# Patient Record
Sex: MALE | Race: WHITE | NOT HISPANIC OR LATINO | Employment: OTHER | URBAN - METROPOLITAN AREA
[De-identification: names, ages, dates, MRNs, and addresses within clinical notes are randomized per-mention and may not be internally consistent; named-entity substitution may affect disease eponyms.]

---

## 2017-02-02 ENCOUNTER — OFFICE VISIT (OUTPATIENT)
Dept: INTERNAL MEDICINE | Facility: CLINIC | Age: 82
End: 2017-02-02

## 2017-02-02 VITALS
BODY MASS INDEX: 26.31 KG/M2 | DIASTOLIC BLOOD PRESSURE: 72 MMHG | HEIGHT: 74 IN | SYSTOLIC BLOOD PRESSURE: 152 MMHG | HEART RATE: 84 BPM | WEIGHT: 205 LBS

## 2017-02-02 DIAGNOSIS — Z87.828 H/O TRAUMATIC SUBDURAL HEMATOMA: ICD-10-CM

## 2017-02-02 DIAGNOSIS — M47.816 SPONDYLOSIS OF LUMBAR REGION WITHOUT MYELOPATHY OR RADICULOPATHY: ICD-10-CM

## 2017-02-02 DIAGNOSIS — Z86.72 HISTORY OF DEEP VEIN THROMBOPHLEBITIS OF LOWER EXTREMITY: ICD-10-CM

## 2017-02-02 DIAGNOSIS — N20.0 RENAL STONES: ICD-10-CM

## 2017-02-02 DIAGNOSIS — F32.A CHRONIC DEPRESSION: ICD-10-CM

## 2017-02-02 DIAGNOSIS — K35.33 ACUTE APPENDICITIS WITH APPENDICEAL ABSCESS: Primary | ICD-10-CM

## 2017-02-02 DIAGNOSIS — I10 ESSENTIAL HYPERTENSION: ICD-10-CM

## 2017-02-02 DIAGNOSIS — F10.20 ALCOHOLISM (HCC): ICD-10-CM

## 2017-02-02 DIAGNOSIS — I48.0 PAF (PAROXYSMAL ATRIAL FIBRILLATION) (HCC): ICD-10-CM

## 2017-02-02 DIAGNOSIS — Z98.890 H/O ABDOMINAL AORTIC ANEURYSM REPAIR: ICD-10-CM

## 2017-02-02 DIAGNOSIS — M47.817 SPONDYLOSIS OF LUMBOSACRAL REGION WITHOUT MYELOPATHY OR RADICULOPATHY: ICD-10-CM

## 2017-02-02 DIAGNOSIS — E78.00 HYPERCHOLESTEROLEMIA: ICD-10-CM

## 2017-02-02 DIAGNOSIS — F41.9 CHRONIC ANXIETY: ICD-10-CM

## 2017-02-02 PROBLEM — M47.819 OSTEOARTHRITIS OF SPINE WITHOUT MYELOPATHY OR RADICULOPATHY: Status: ACTIVE | Noted: 2017-02-02

## 2017-02-02 LAB
ALBUMIN SERPL-MCNC: 3.91 G/DL (ref 3.4–4.6)
ALBUMIN/GLOB SERPL: 1.2 G/DL
ALP SERPL-CCNC: 76 U/L (ref 46–116)
ALT SERPL W P-5'-P-CCNC: 20 U/L (ref 16–63)
ANION GAP SERPL CALCULATED.3IONS-SCNC: 9 MMOL/L
AST SERPL-CCNC: 12 U/L (ref 7–37)
BILIRUB SERPL-MCNC: 0.5 MG/DL (ref 0.2–1)
BILIRUB UR QL STRIP: NEGATIVE
BUN BLD-MCNC: 23 MG/DL (ref 6–22)
BUN/CREAT SERPL: 18.9 (ref 7–25)
CALCIUM SPEC-SCNC: 9.4 MG/DL (ref 8.6–10.5)
CHLORIDE SERPL-SCNC: 103 MMOL/L (ref 95–107)
CLARITY UR: CLEAR
CO2 SERPL-SCNC: 28 MMOL/L (ref 23–32)
COLOR UR: YELLOW
CREAT BLD-MCNC: 1.22 MG/DL (ref 0.7–1.3)
GFR SERPL CREATININE-BSD FRML MDRD: 57 ML/MIN/1.73
GLOBULIN UR ELPH-MCNC: 3.3 GM/DL
GLUCOSE BLD-MCNC: 105 MG/DL (ref 70–100)
GLUCOSE UR STRIP-MCNC: NEGATIVE MG/DL
HGB UR QL STRIP.AUTO: NEGATIVE
KETONES UR QL STRIP: ABNORMAL
LEUKOCYTE ESTERASE UR QL STRIP.AUTO: NEGATIVE
NITRITE UR QL STRIP: NEGATIVE
PH UR STRIP.AUTO: 5.5 [PH] (ref 5–8)
POTASSIUM BLD-SCNC: 4.3 MMOL/L (ref 3.3–5.3)
PROT SERPL-MCNC: 7.2 G/DL (ref 6.3–8.4)
PROT UR QL STRIP: ABNORMAL
SODIUM BLD-SCNC: 140 MMOL/L (ref 136–145)
SP GR UR STRIP: >=1.03 (ref 1–1.03)
TSH SERPL DL<=0.05 MIU/L-ACNC: 2.63 MIU/ML (ref 0.4–4.2)
UROBILINOGEN UR QL STRIP: ABNORMAL

## 2017-02-02 PROCEDURE — 80053 COMPREHEN METABOLIC PANEL: CPT | Performed by: INTERNAL MEDICINE

## 2017-02-02 PROCEDURE — 99205 OFFICE O/P NEW HI 60 MIN: CPT | Performed by: INTERNAL MEDICINE

## 2017-02-02 PROCEDURE — 84443 ASSAY THYROID STIM HORMONE: CPT | Performed by: INTERNAL MEDICINE

## 2017-02-02 PROCEDURE — 81003 URINALYSIS AUTO W/O SCOPE: CPT | Performed by: INTERNAL MEDICINE

## 2017-02-02 RX ORDER — SIMVASTATIN 10 MG
TABLET ORAL
COMMUNITY
Start: 2017-01-13 | End: 2017-05-04 | Stop reason: SDUPTHER

## 2017-02-02 RX ORDER — LISINOPRIL 40 MG/1
40 TABLET ORAL
Status: ON HOLD | COMMUNITY
Start: 2017-01-17 | End: 2017-09-10

## 2017-02-02 RX ORDER — CYCLOBENZAPRINE HCL 5 MG
TABLET ORAL
COMMUNITY
Start: 2016-12-02 | End: 2017-02-02

## 2017-02-02 NOTE — PROGRESS NOTES
Subjective   Neal Flores is a 81 y.o. male.     History of Present Illness he is here today to establish care and he has a long history of multiple medical problems.  He has been treated for hypertension over the last 10-15 years presently using lisinopril.  He is on Zocor for hypercholesterolemia and Lexapro over the last 8-10 years for chronic depression and anxiety.    He was hospitalized in September 2016 with an appendiceal abscess.  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  He knows of at least 1 on the left and 2 on the right.  His last fall was in December 2016.  He has had some mild dizziness and mild frontal headache since that time but no repeat syncope or falls.  He denied any focal neurologic symptoms and he has not had any nausea or vomiting.  He had an evacuation of a left subdural hematoma in 2010.    He underwent correction and stent placement for an abdominal aortic aneurysm 2008.  He underwent electrocardioversion in 2011 4 PAF. He underwent cervical spine surgery in the 1980s.  He has known lumbar spine degenerative joint and degenerative disc disease.  He uses up to 6 Advil per day without relief.  He was treated for deep vein thrombophlebitis of the left lower extremity involving the femoral vein in May 2016.    He smokes one pack of cigarettes per dayand drinks whiskey during his binge drinking.  For the last year he has had one flight and mild to moderate flat level dyspnea on exertion.  He denies any chest pain or PND.  He denies daily cough or wheezing.  He has had nocturia for at least one year and does have a sense of incomplete voiding at times.  He denies any history of migraine or asthma or peptic ulcer disease.  He does have known renal stones without any episodes of renal colic.        Review of Systems   Constitutional: Negative for activity change, appetite change and fatigue.   HENT: Negative for trouble swallowing.     Respiratory: Positive for shortness of breath. Negative for cough, chest tightness and wheezing.    Cardiovascular: Negative for chest pain, palpitations and leg swelling.   Gastrointestinal: Negative for abdominal pain, blood in stool, constipation, diarrhea, nausea and vomiting.   Genitourinary: Positive for frequency. Negative for difficulty urinating, flank pain and hematuria.   Musculoskeletal: Positive for back pain. Negative for arthralgias, gait problem, joint swelling and neck stiffness.   Neurological: Positive for dizziness and headaches. Negative for seizures, syncope, facial asymmetry, speech difficulty, weakness and numbness.   Psychiatric/Behavioral: Negative for confusion and dysphoric mood. The patient is not nervous/anxious.        Objective   Physical Exam   Constitutional: He is oriented to person, place, and time. Vital signs are normal. He appears well-developed and well-nourished. He is active. He does not appear ill.   Eyes: Conjunctivae are normal.   Fundoscopic exam:       The right eye shows no AV nicking, no exudate and no hemorrhage.        The left eye shows no AV nicking, no exudate and no hemorrhage.   Neck: Carotid bruit is not present. No thyroid mass and no thyromegaly present.   Cardiovascular: Normal rate, regular rhythm, S1 normal and S2 normal.  Exam reveals no S3 and no S4.    No murmur heard.  Pulses:       Posterior tibial pulses are 2+ on the right side, and 2+ on the left side.   Pulmonary/Chest: No tachypnea. No respiratory distress. He has no decreased breath sounds. He has no wheezes. He has no rhonchi. He has no rales.   Abdominal: Soft. Normal appearance and bowel sounds are normal. He exhibits no abdominal bruit and no mass. There is no hepatosplenomegaly. There is no tenderness.       Vascular Status -  His exam exhibits no right foot edema. His exam exhibits no left foot edema.  Neurological: He is alert and oriented to person, place, and time. He has normal  strength. He displays no tremor. He displays a negative Romberg sign. Gait normal.   Reflex Scores:       Bicep reflexes are 2+ on the right side and 2+ on the left side.  Psychiatric: He has a normal mood and affect. His speech is normal and behavior is normal. Judgment and thought content normal. Cognition and memory are normal.       Assessment/Plan assessment #1 hypertension-good control #2 alcoholism-without a doubt his major present problem and this was discussed at some length with the patient and his daughter #3 lumbar spine degenerative joint and degenerative disc disease- I do not believe that Advil or Tylenol will be of benefit and certainly I do not feel that he should begin   Narcotic analgesics. #4 history of subdural hematoma-no sign of neurologic compromise at present.    Plan #1 he is strongly advised to attend AA on a regular basis #2 no change in present medication #3 CBC, CMP, urinalysis, TSH today.  Routine follow-up with me in 3 months.

## 2017-02-03 LAB
BASOPHILS # BLD AUTO: 0.1 X10E3/UL (ref 0–0.2)
BASOPHILS NFR BLD AUTO: 1 %
EOSINOPHIL # BLD AUTO: 0.3 X10E3/UL (ref 0–0.4)
EOSINOPHIL # BLD AUTO: 4 %
ERYTHROCYTE [DISTWIDTH] IN BLOOD BY AUTOMATED COUNT: 20.2 % (ref 12.3–15.4)
HCT VFR BLD AUTO: 39.7 % (ref 37.5–51)
HGB BLD-MCNC: 12.5 G/DL (ref 12.6–17.7)
IMM GRANULOCYTES # BLD: 0 X10E3/UL (ref 0–0.1)
IMM GRANULOCYTES NFR BLD: 0 %
LYMPHOCYTES # BLD AUTO: 1.6 X10E3/UL (ref 0.7–3.1)
LYMPHOCYTES NFR BLD AUTO: 22 %
MCH RBC QN AUTO: 24.3 PG (ref 26.6–33)
MCHC RBC AUTO-ENTMCNC: 31.5 G/DL (ref 31.5–35.7)
MCV RBC AUTO: 77 FL (ref 79–97)
MONOCYTES # BLD AUTO: 0.5 X10E3/UL (ref 0.1–0.9)
MONOCYTES NFR BLD AUTO: 7 %
NEUTROPHILS # BLD AUTO: 4.9 X10E3/UL (ref 1.4–7)
NEUTROPHILS NFR BLD AUTO: 66 %
PLATELET # BLD AUTO: 266 X10E3/UL (ref 150–379)
RBC # BLD AUTO: 5.14 X10E6/UL (ref 4.14–5.8)
WBC # BLD AUTO: 7.3 X10E3/UL (ref 3.4–10.8)

## 2017-02-27 ENCOUNTER — TELEPHONE (OUTPATIENT)
Dept: INTERNAL MEDICINE | Facility: CLINIC | Age: 82
End: 2017-02-27

## 2017-02-27 DIAGNOSIS — F39 MOOD DISORDER (HCC): Primary | ICD-10-CM

## 2017-02-27 NOTE — TELEPHONE ENCOUNTER
----- Message from Sindy Cuello sent at 2/27/2017  3:30 PM EST -----  Pt needs refill on his Lexapro 20 mg   He takes one a day- last given by his previous MD---- Dr. Eisenmenger  This is a med you have not filled yet for pt    [address] - [phone] [facility] [phone] FX    Pt# [phone]

## 2017-02-28 RX ORDER — ESCITALOPRAM OXALATE 20 MG/1
20 TABLET ORAL DAILY
Qty: 90 TABLET | Refills: 0 | Status: SHIPPED | OUTPATIENT
Start: 2017-02-28 | End: 2017-06-05 | Stop reason: SDUPTHER

## 2017-02-28 NOTE — TELEPHONE ENCOUNTER
Call made to patients [facility] pharmacy to see when the last refill was given to Ms. Flores and the pharmacist informed me that Mr. Flores had Lexapro 20 mg  filled by Dr. James Eisenmenger on 1/13/17 for # 45 tablets w/ zero refills.    Per provider request I sent in a Lexapro 20 mg for 90 tablets w/ zero refills to the patients local pharmacy.

## 2017-05-04 ENCOUNTER — OFFICE VISIT (OUTPATIENT)
Dept: INTERNAL MEDICINE | Facility: CLINIC | Age: 82
End: 2017-05-04

## 2017-05-04 VITALS
HEART RATE: 84 BPM | WEIGHT: 207.6 LBS | SYSTOLIC BLOOD PRESSURE: 118 MMHG | BODY MASS INDEX: 26.64 KG/M2 | DIASTOLIC BLOOD PRESSURE: 46 MMHG | HEIGHT: 74 IN

## 2017-05-04 DIAGNOSIS — N91.2 AMENIA: ICD-10-CM

## 2017-05-04 DIAGNOSIS — I48.0 PAF (PAROXYSMAL ATRIAL FIBRILLATION) (HCC): ICD-10-CM

## 2017-05-04 DIAGNOSIS — I10 BENIGN ESSENTIAL HYPERTENSION: Primary | ICD-10-CM

## 2017-05-04 DIAGNOSIS — M51.36 DEGENERATION OF INTERVERTEBRAL DISC OF LUMBAR REGION: ICD-10-CM

## 2017-05-04 PROBLEM — G62.1 ALCOHOL-INDUCED POLYNEUROPATHY (HCC): Status: ACTIVE | Noted: 2017-05-04

## 2017-05-04 PROBLEM — M47.816 SPONDYLOSIS OF LUMBAR REGION WITHOUT MYELOPATHY OR RADICULOPATHY: Status: RESOLVED | Noted: 2017-02-02 | Resolved: 2017-05-04

## 2017-05-04 PROBLEM — M47.819 OSTEOARTHRITIS OF SPINE WITHOUT MYELOPATHY OR RADICULOPATHY: Status: RESOLVED | Noted: 2017-02-02 | Resolved: 2017-05-04

## 2017-05-04 LAB
BASOPHILS # BLD AUTO: 0.07 10*3/MM3 (ref 0–0.2)
BASOPHILS NFR BLD AUTO: 1 % (ref 0–2)
DEPRECATED RDW RBC AUTO: 48.8 FL (ref 37–54)
EOSINOPHIL # BLD AUTO: 0.7 10*3/MM3 (ref 0–0.7)
EOSINOPHIL NFR BLD AUTO: 10.1 % (ref 0–5)
ERYTHROCYTE [DISTWIDTH] IN BLOOD BY AUTOMATED COUNT: 16.9 % (ref 11.5–15)
HCT VFR BLD AUTO: 38.3 % (ref 40.1–51)
HGB BLD-MCNC: 11.6 G/DL (ref 13.7–17.5)
LYMPHOCYTES # BLD AUTO: 1.57 10*3/MM3 (ref 0.8–7)
LYMPHOCYTES NFR BLD AUTO: 22.6 % (ref 10–60)
MCH RBC QN AUTO: 24.2 PG (ref 26–34)
MCHC RBC AUTO-ENTMCNC: 30.3 G/DL (ref 31–37)
MCV RBC AUTO: 80 FL (ref 80–100)
MONOCYTES # BLD AUTO: 0.61 10*3/MM3 (ref 0–1)
MONOCYTES NFR BLD AUTO: 8.8 % (ref 0–13)
NEUTROPHILS # BLD AUTO: 4.01 10*3/MM3 (ref 1–11)
NEUTROPHILS NFR BLD AUTO: 57.5 % (ref 30–85)
PLATELET # BLD AUTO: 257 10*3/MM3 (ref 150–450)
PMV BLD AUTO: 10.2 FL (ref 6–12)
RBC # BLD AUTO: 4.79 10*6/MM3 (ref 4.63–6.08)
WBC NRBC COR # BLD: 6.96 10*3/MM3 (ref 5–10)

## 2017-05-04 PROCEDURE — 85025 COMPLETE CBC W/AUTO DIFF WBC: CPT | Performed by: INTERNAL MEDICINE

## 2017-05-04 PROCEDURE — 99213 OFFICE O/P EST LOW 20 MIN: CPT | Performed by: INTERNAL MEDICINE

## 2017-05-04 PROCEDURE — 36415 COLL VENOUS BLD VENIPUNCTURE: CPT | Performed by: INTERNAL MEDICINE

## 2017-05-04 RX ORDER — SIMVASTATIN 10 MG
10 TABLET ORAL NIGHTLY
Qty: 90 TABLET | Refills: 3 | Status: SHIPPED | OUTPATIENT
Start: 2017-05-04 | End: 2018-05-07 | Stop reason: SDUPTHER

## 2017-05-10 ENCOUNTER — TELEPHONE (OUTPATIENT)
Dept: INTERNAL MEDICINE | Facility: CLINIC | Age: 82
End: 2017-05-10

## 2017-05-15 ENCOUNTER — TELEPHONE (OUTPATIENT)
Dept: INTERNAL MEDICINE | Facility: CLINIC | Age: 82
End: 2017-05-15

## 2017-06-05 DIAGNOSIS — F39 MOOD DISORDER (HCC): ICD-10-CM

## 2017-06-05 RX ORDER — ESCITALOPRAM OXALATE 20 MG/1
TABLET ORAL
Qty: 90 TABLET | Refills: 1 | Status: SHIPPED | OUTPATIENT
Start: 2017-06-05 | End: 2017-11-26 | Stop reason: SDUPTHER

## 2017-09-06 ENCOUNTER — APPOINTMENT (OUTPATIENT)
Dept: CT IMAGING | Facility: HOSPITAL | Age: 82
End: 2017-09-06

## 2017-09-06 ENCOUNTER — APPOINTMENT (OUTPATIENT)
Dept: GENERAL RADIOLOGY | Facility: HOSPITAL | Age: 82
End: 2017-09-06

## 2017-09-06 ENCOUNTER — HOSPITAL ENCOUNTER (INPATIENT)
Facility: HOSPITAL | Age: 82
LOS: 4 days | Discharge: HOME HEALTH | End: 2017-09-10
Attending: EMERGENCY MEDICINE | Admitting: HOSPITALIST

## 2017-09-06 DIAGNOSIS — R26.89 IMPAIRED GAIT AND MOBILITY: ICD-10-CM

## 2017-09-06 DIAGNOSIS — N17.9 AKI (ACUTE KIDNEY INJURY) (HCC): Primary | ICD-10-CM

## 2017-09-06 DIAGNOSIS — G62.1 ALCOHOL-INDUCED POLYNEUROPATHY (HCC): ICD-10-CM

## 2017-09-06 DIAGNOSIS — E86.0 DEHYDRATION: ICD-10-CM

## 2017-09-06 DIAGNOSIS — F10.10 ALCOHOL ABUSE: ICD-10-CM

## 2017-09-06 LAB
ALBUMIN SERPL-MCNC: 3.4 G/DL (ref 3.5–5.2)
ALBUMIN/GLOB SERPL: 1 G/DL
ALP SERPL-CCNC: 104 U/L (ref 39–117)
ALT SERPL W P-5'-P-CCNC: 61 U/L (ref 1–41)
AMMONIA BLD-SCNC: 26 UMOL/L (ref 16–60)
ANION GAP SERPL CALCULATED.3IONS-SCNC: 26.2 MMOL/L
APTT PPP: 26.7 SECONDS (ref 22.7–35.4)
AST SERPL-CCNC: 100 U/L (ref 1–40)
BACTERIA UR QL AUTO: ABNORMAL /HPF
BASOPHILS # BLD AUTO: 0.02 10*3/MM3 (ref 0–0.2)
BASOPHILS NFR BLD AUTO: 0.2 % (ref 0–1.5)
BILIRUB SERPL-MCNC: 1.3 MG/DL (ref 0.1–1.2)
BILIRUB UR QL STRIP: NEGATIVE
BUN BLD-MCNC: 23 MG/DL (ref 8–23)
BUN/CREAT SERPL: 9.3 (ref 7–25)
CALCIUM SPEC-SCNC: 9.6 MG/DL (ref 8.6–10.5)
CHLORIDE SERPL-SCNC: 91 MMOL/L (ref 98–107)
CLARITY UR: ABNORMAL
CO2 SERPL-SCNC: 20.8 MMOL/L (ref 22–29)
COD CRY URNS QL: ABNORMAL /HPF
COLOR UR: YELLOW
CREAT BLD-MCNC: 2.47 MG/DL (ref 0.76–1.27)
DEPRECATED RDW RBC AUTO: 73.5 FL (ref 37–54)
EOSINOPHIL # BLD AUTO: 0 10*3/MM3 (ref 0–0.7)
EOSINOPHIL NFR BLD AUTO: 0 % (ref 0.3–6.2)
ERYTHROCYTE [DISTWIDTH] IN BLOOD BY AUTOMATED COUNT: 23 % (ref 11.5–14.5)
ETHANOL BLD-MCNC: <10 MG/DL (ref 0–10)
ETHANOL UR QL: <0.01 %
FINE GRAN CASTS URNS QL MICRO: ABNORMAL /LPF
GFR SERPL CREATININE-BSD FRML MDRD: 25 ML/MIN/1.73
GLOBULIN UR ELPH-MCNC: 3.3 GM/DL
GLUCOSE BLD-MCNC: 113 MG/DL (ref 65–99)
GLUCOSE UR STRIP-MCNC: NEGATIVE MG/DL
HCT VFR BLD AUTO: 42.8 % (ref 40.4–52.2)
HGB BLD-MCNC: 13.7 G/DL (ref 13.7–17.6)
HGB UR QL STRIP.AUTO: ABNORMAL
HYALINE CASTS UR QL AUTO: ABNORMAL /LPF
IMM GRANULOCYTES # BLD: 0.07 10*3/MM3 (ref 0–0.03)
IMM GRANULOCYTES NFR BLD: 0.5 % (ref 0–0.5)
INR PPP: 1.08 (ref 0.9–1.1)
KETONES UR QL STRIP: ABNORMAL
LEUKOCYTE ESTERASE UR QL STRIP.AUTO: ABNORMAL
LIPASE SERPL-CCNC: 18 U/L (ref 13–60)
LYMPHOCYTES # BLD AUTO: 0.94 10*3/MM3 (ref 0.9–4.8)
LYMPHOCYTES NFR BLD AUTO: 7.2 % (ref 19.6–45.3)
MAGNESIUM SERPL-MCNC: 2 MG/DL (ref 1.6–2.4)
MCH RBC QN AUTO: 28.3 PG (ref 27–32.7)
MCHC RBC AUTO-ENTMCNC: 32 G/DL (ref 32.6–36.4)
MCV RBC AUTO: 88.4 FL (ref 79.8–96.2)
MONOCYTES # BLD AUTO: 1.06 10*3/MM3 (ref 0.2–1.2)
MONOCYTES NFR BLD AUTO: 8.1 % (ref 5–12)
NEUTROPHILS # BLD AUTO: 10.95 10*3/MM3 (ref 1.9–8.1)
NEUTROPHILS NFR BLD AUTO: 84 % (ref 42.7–76)
NITRITE UR QL STRIP: NEGATIVE
PH UR STRIP.AUTO: <=5 [PH] (ref 5–8)
PLATELET # BLD AUTO: 169 10*3/MM3 (ref 140–500)
PMV BLD AUTO: 10.7 FL (ref 6–12)
POTASSIUM BLD-SCNC: 4 MMOL/L (ref 3.5–5.2)
PROT SERPL-MCNC: 6.7 G/DL (ref 6–8.5)
PROT UR QL STRIP: ABNORMAL
PROTHROMBIN TIME: 13.6 SECONDS (ref 11.7–14.2)
RBC # BLD AUTO: 4.84 10*6/MM3 (ref 4.6–6)
RBC # UR: ABNORMAL /HPF
REF LAB TEST METHOD: ABNORMAL
SODIUM BLD-SCNC: 138 MMOL/L (ref 136–145)
SP GR UR STRIP: 1.02 (ref 1–1.03)
SQUAMOUS #/AREA URNS HPF: ABNORMAL /HPF
UROBILINOGEN UR QL STRIP: ABNORMAL
WBC NRBC COR # BLD: 13.04 10*3/MM3 (ref 4.5–10.7)
WBC UR QL AUTO: ABNORMAL /HPF

## 2017-09-06 PROCEDURE — 83690 ASSAY OF LIPASE: CPT | Performed by: EMERGENCY MEDICINE

## 2017-09-06 PROCEDURE — 70450 CT HEAD/BRAIN W/O DYE: CPT

## 2017-09-06 PROCEDURE — 25010000002 ONDANSETRON PER 1 MG: Performed by: EMERGENCY MEDICINE

## 2017-09-06 PROCEDURE — 25010000002 MAGNESIUM SULFATE PER 500 MG OF MAGNESIUM: Performed by: EMERGENCY MEDICINE

## 2017-09-06 PROCEDURE — 82140 ASSAY OF AMMONIA: CPT | Performed by: EMERGENCY MEDICINE

## 2017-09-06 PROCEDURE — 80053 COMPREHEN METABOLIC PANEL: CPT | Performed by: EMERGENCY MEDICINE

## 2017-09-06 PROCEDURE — 81001 URINALYSIS AUTO W/SCOPE: CPT | Performed by: EMERGENCY MEDICINE

## 2017-09-06 PROCEDURE — 85025 COMPLETE CBC W/AUTO DIFF WBC: CPT | Performed by: EMERGENCY MEDICINE

## 2017-09-06 PROCEDURE — 83735 ASSAY OF MAGNESIUM: CPT | Performed by: EMERGENCY MEDICINE

## 2017-09-06 PROCEDURE — 71020 HC CHEST PA AND LATERAL: CPT

## 2017-09-06 PROCEDURE — 99285 EMERGENCY DEPT VISIT HI MDM: CPT

## 2017-09-06 PROCEDURE — 74176 CT ABD & PELVIS W/O CONTRAST: CPT

## 2017-09-06 PROCEDURE — 85610 PROTHROMBIN TIME: CPT | Performed by: EMERGENCY MEDICINE

## 2017-09-06 PROCEDURE — 25010000002 THIAMINE PER 100 MG: Performed by: EMERGENCY MEDICINE

## 2017-09-06 PROCEDURE — 80307 DRUG TEST PRSMV CHEM ANLYZR: CPT | Performed by: EMERGENCY MEDICINE

## 2017-09-06 PROCEDURE — 85730 THROMBOPLASTIN TIME PARTIAL: CPT | Performed by: EMERGENCY MEDICINE

## 2017-09-06 PROCEDURE — 87086 URINE CULTURE/COLONY COUNT: CPT | Performed by: EMERGENCY MEDICINE

## 2017-09-06 RX ORDER — FAMOTIDINE 20 MG/1
20 TABLET, FILM COATED ORAL DAILY
COMMUNITY

## 2017-09-06 RX ORDER — ONDANSETRON 2 MG/ML
4 INJECTION INTRAMUSCULAR; INTRAVENOUS ONCE
Status: COMPLETED | OUTPATIENT
Start: 2017-09-06 | End: 2017-09-06

## 2017-09-06 RX ADMIN — ONDANSETRON 4 MG: 2 INJECTION INTRAMUSCULAR; INTRAVENOUS at 20:45

## 2017-09-06 RX ADMIN — FOLIC ACID 200 ML/HR: 5 INJECTION, SOLUTION INTRAMUSCULAR; INTRAVENOUS; SUBCUTANEOUS at 20:40

## 2017-09-06 NOTE — ED NOTES
"Pt reports \"janiya been binge drinking the past 3 weeks. I drink about 1/5 a night\". Last drink was last night. Pt reports falling yesterday night. Pt reports leg pain, back pain and toe pain from the fall. Fall was not witnessed. Pt unaware if hit head or LOC.      Alejandra Castañeda RN  09/06/17 1957    "

## 2017-09-06 NOTE — ED PROVIDER NOTES
" EMERGENCY DEPARTMENT ENCOUNTER    CHIEF COMPLAINT  Chief Complaint: Paresthesias   History given by: Patient, Paramedics   History limited by: Patient is a vague historian  Room Number: 13/13  PMD: Praveen Cruz Jr., MD      HPI:  Pt reports that he is an alcoholic and drinks 1/5 alcohol per day. Pt reports that today, he has drank either a pint of ETOH or 1/5 of ETOH. Pt presents to the ED c/o intermittent episodes of numbness to the feet bilaterally. Pt does not specify when his sx began. Pt states that he has also had some pain of the feet bilaterally. Pt denies speech/visual difficulties, focal weakness, CP, dyspnea, and HA. Per paramedics, family is concerned that pt is unable to care for himself at home. There are no other complaints at this time.      Location: Feet bilaterally  Radiation: None  Quality: \"numbness\"  Intensity/Severity: Moderate  Duration: Time of onset is unknown  Onset quality: Gradual  Timing: Intermittent  Progression: Waxing and waning  Aggravating Factors: Nothing  Alleviating Factors: Nothing  Previous Episodes: Unknown  Treatment before arrival: None  Associated Symptoms: Pain to the feet bilaterally       PAST MEDICAL HISTORY  Active Ambulatory Problems     Diagnosis Date Noted   • Benign essential hypertension 02/02/2017   • Hypercholesterolemia 02/02/2017   • Alcoholism 02/02/2017   • PAF (paroxysmal atrial fibrillation) 02/02/2017   • Renal stones 02/02/2017   • H/O abdominal aortic aneurysm repair 02/02/2017   • History of deep vein thrombophlebitis of lower extremity 02/02/2017   • Chronic anxiety 02/02/2017   • Chronic depression 02/02/2017   • H/O traumatic subdural hematoma 02/02/2017   • Acute appendicitis with appendiceal abscess 02/02/2017   • Recurrent falls 11/19/2016   • Multiple pulmonary nodules 02/26/2016   • Syncope 12/12/2016   • Fracture of multiple ribs 11/19/2016   • Hypothyroidism 02/25/2016   • Deep vein thrombosis 02/25/2016   • Degeneration of " "intervertebral disc of lumbar region 06/30/2016   • 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   • [place] 05/04/2017     Resolved Ambulatory Problems     Diagnosis Date Noted   • 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 02/02/2017   • Spondylosis of lumbar region without myelopathy or radiculopathy 02/02/2017   • Mobitz type I incomplete atrioventricular block 09/08/2016   • Leukocytosis 09/10/2016   • Arthropathy of lumbar facet joint 06/30/2016   • Drug-induced bradycardia 09/08/2016   • Back pain 06/30/2016   • Anxiety 02/25/2016   • Alcohol abuse 05/07/2016   • Acute alcoholism 05/07/2016   • Abnormal electrocardiogram 05/07/2016     Past Medical History:   Diagnosis Date   • Anxiety    • Arthritis    • Depression    • Hyperlipidemia    • Hypertension    • Kidney stone          PAST SURGICAL HISTORY  Past Surgical History:   Procedure Laterality Date   • LAMINECTOMY     • TONSILLECTOMY           FAMILY HISTORY  History reviewed. No pertinent family history.      SOCIAL HISTORY  Social History     Social History   • Marital status: Unknown     Spouse name: N/A   • Number of children: N/A   • Years of education: N/A     Occupational History   • Not on file.     Social History Main Topics   • Smoking status: Current Every Day Smoker     Packs/day: 1.00     Types: Cigarettes   • Smokeless tobacco: Not on file   • Alcohol use Yes      Comment: \"1/5 alcohol a day\"   • Drug use: No   • Sexual activity: Defer     Other Topics Concern   • Not on file     Social History Narrative   • No narrative on file         ALLERGIES  Review of patient's allergies indicates no known allergies.        REVIEW OF SYSTEMS  Review of Systems   Unable to perform ROS: Other (pt is a vague historian)   Eyes: Negative for visual disturbance.   Respiratory: Negative for shortness of breath.    Cardiovascular: Negative for chest pain.   Musculoskeletal:        Pain of the feet bilaterally   Neurological: Positive for numbness " (to the feet bilaterally). Negative for speech difficulty, weakness and headaches.            PHYSICAL EXAM  ED Triage Vitals   Temp Heart Rate Resp BP SpO2   09/06/17 1921 09/06/17 1921 09/06/17 1921 09/06/17 1921 09/06/17 1921   97.6 °F (36.4 °C) 98 18 135/91 98 % WNL      Temp src Heart Rate Source Patient Position BP Location FiO2 (%)   -- -- -- -- --              Physical Exam   Constitutional: He is oriented to person, place, and time. No distress.   Pt is disheveled and unkempt.    HENT:   Head: Normocephalic.   Mouth/Throat: Mucous membranes are normal.   Eyes: EOM are normal. Pupils are equal, round, and reactive to light.   Neck: Normal range of motion. Neck supple.   Cardiovascular: Normal rate, regular rhythm and normal heart sounds.    Pulmonary/Chest: Effort normal and breath sounds normal. No respiratory distress. He has no decreased breath sounds. He has no wheezes. He has no rhonchi. He has no rales.   Abdominal: Soft. He exhibits distension (mild). There is hepatomegaly. There is generalized tenderness (mild). There is no rebound and no guarding.   Musculoskeletal: Normal range of motion. He exhibits edema (BLE edema).   Neurological: He is alert and oriented to person, place, and time. He has normal sensation.   Skin: Skin is warm and dry. Abrasion (Multiple abrasions to the BLEs (they are in various stages of healing)) noted.   Psychiatric: Mood and affect normal.   Nursing note and vitals reviewed.          LAB RESULTS  Recent Results (from the past 24 hour(s))   Comprehensive Metabolic Panel    Collection Time: 09/06/17  8:18 PM   Result Value Ref Range    Glucose 113 (H) 65 - 99 mg/dL    BUN 23 8 - 23 mg/dL    Creatinine 2.47 (H) 0.76 - 1.27 mg/dL    Sodium 138 136 - 145 mmol/L    Potassium 4.0 3.5 - 5.2 mmol/L    Chloride 91 (L) 98 - 107 mmol/L    CO2 20.8 (L) 22.0 - 29.0 mmol/L    Calcium 9.6 8.6 - 10.5 mg/dL    Total Protein 6.7 6.0 - 8.5 g/dL    Albumin 3.40 (L) 3.50 - 5.20 g/dL    ALT  (SGPT) 61 (H) 1 - 41 U/L    AST (SGOT) 100 (H) 1 - 40 U/L    Alkaline Phosphatase 104 39 - 117 U/L    Total Bilirubin 1.3 (H) 0.1 - 1.2 mg/dL    eGFR Non African Amer 25 (L) >60 mL/min/1.73    Globulin 3.3 gm/dL    A/G Ratio 1.0 g/dL    BUN/Creatinine Ratio 9.3 7.0 - 25.0    Anion Gap 26.2 mmol/L   Protime-INR    Collection Time: 09/06/17  8:18 PM   Result Value Ref Range    Protime 13.6 11.7 - 14.2 Seconds    INR 1.08 0.90 - 1.10   aPTT    Collection Time: 09/06/17  8:18 PM   Result Value Ref Range    PTT 26.7 22.7 - 35.4 seconds   Lipase    Collection Time: 09/06/17  8:18 PM   Result Value Ref Range    Lipase 18 13 - 60 U/L   Ethanol    Collection Time: 09/06/17  8:18 PM   Result Value Ref Range    Ethanol <10 0 - 10 mg/dL    Ethanol % <0.010 %   Magnesium    Collection Time: 09/06/17  8:18 PM   Result Value Ref Range    Magnesium 2.0 1.6 - 2.4 mg/dL   CBC Auto Differential    Collection Time: 09/06/17  8:18 PM   Result Value Ref Range    WBC 13.04 (H) 4.50 - 10.70 10*3/mm3    RBC 4.84 4.60 - 6.00 10*6/mm3    Hemoglobin 13.7 13.7 - 17.6 g/dL    Hematocrit 42.8 40.4 - 52.2 %    MCV 88.4 79.8 - 96.2 fL    MCH 28.3 27.0 - 32.7 pg    MCHC 32.0 (L) 32.6 - 36.4 g/dL    RDW 23.0 (H) 11.5 - 14.5 %    RDW-SD 73.5 (H) 37.0 - 54.0 fl    MPV 10.7 6.0 - 12.0 fL    Platelets 169 140 - 500 10*3/mm3    Neutrophil % 84.0 (H) 42.7 - 76.0 %    Lymphocyte % 7.2 (L) 19.6 - 45.3 %    Monocyte % 8.1 5.0 - 12.0 %    Eosinophil % 0.0 (L) 0.3 - 6.2 %    Basophil % 0.2 0.0 - 1.5 %    Immature Grans % 0.5 0.0 - 0.5 %    Neutrophils, Absolute 10.95 (H) 1.90 - 8.10 10*3/mm3    Lymphocytes, Absolute 0.94 0.90 - 4.80 10*3/mm3    Monocytes, Absolute 1.06 0.20 - 1.20 10*3/mm3    Eosinophils, Absolute 0.00 0.00 - 0.70 10*3/mm3    Basophils, Absolute 0.02 0.00 - 0.20 10*3/mm3    Immature Grans, Absolute 0.07 (H) 0.00 - 0.03 10*3/mm3   Ammonia    Collection Time: 09/06/17  8:18 PM   Result Value Ref Range    Ammonia 26 16 - 60 umol/L   Urinalysis  With / Culture If Indicated    Collection Time: 09/06/17  8:49 PM   Result Value Ref Range    Color, UA Yellow Yellow, Straw    Appearance, UA Cloudy (A) Clear    pH, UA <=5.0 5.0 - 8.0    Specific Gravity, UA 1.024 1.005 - 1.030    Glucose, UA Negative Negative    Ketones, UA Trace (A) Negative    Bilirubin, UA Negative Negative    Blood, UA Small (1+) (A) Negative    Protein, UA 30 mg/dL (1+) (A) Negative    Leuk Esterase, UA Small (1+) (A) Negative    Nitrite, UA Negative Negative    Urobilinogen, UA 1.0 E.U./dL 0.2 - 1.0 E.U./dL   Urinalysis, Microscopic Only    Collection Time: 09/06/17  8:49 PM   Result Value Ref Range    RBC, UA 3-5 (A) None Seen, 0-2 /HPF    WBC, UA 13-20 (A) None Seen, 0-2 /HPF    Bacteria, UA 1+ (A) None Seen /HPF    Squamous Epithelial Cells, UA 3-6 (A) None Seen, 0-2 /HPF    Hyaline Casts, UA 7-12 None Seen /LPF    Fine Granular Casts, UA 0-2 None Seen /LPF    Calcium Oxalate Crystals, UA Small/1+ None Seen /HPF    Methodology Manual Light Microscopy        Ordered the above labs and reviewed the results.        RADIOLOGY  CT Abdomen Pelvis Without Contrast   Final Result   1.  Extensive fatty liver.   2. Small left renal lesion, favor cyst but incompletely characterized   without contrast. Recommend follow-up.   3. Uncomplicated cholelithiasis.   4. Small adrenal nodules as discussed. Follow-up recommended.   5. Colonic diverticulosis                   This study was performed with techniques to keep radiation doses as low   as reasonably achievable (ALARA). Individualized dose reduction   techniques using automated exposure control or adjustment of mA and/or   kV according to the patient size were employed.        This report was finalized on 9/6/2017 10:07 PM by Babar Jhaveri MD.    Interpreted by radiologist. Independently viewed by me.         CT Head Without Contrast   Final Result   1. No acute intracranial abnormality.                           This study was performed with  techniques to keep radiation doses as low   as reasonably achievable (ALARA). Individualized dose reduction   techniques using automated exposure control or adjustment of mA and/or   kV according to the patient size were employed.        This report was finalized on 9/6/2017 10:02 PM by Babar Jhaveri MD.    Interpreted by radiologist. Independently viewed by me.         XR Chest 2 View   Final Result   1. Chronic findings as above, no active airspace disease appreciated.       This report was finalized on 9/6/2017 9:23 PM by Babar Jhaveri MD.    Interpreted by radiologist. Independently viewed by me.                Ordered the above noted radiological studies. Reviewed by me in PACS.       PROCEDURES  Procedures          PROGRESS AND CONSULTS  ED Course     8:07 PM:  Blood work, BAL, UA, CXR, and CT Abd ordered for further evaluation. Banana bag ordered.    8:42 PM:  Per RN, pt is currently vomiting. Zofran ordered.     10:22 PM:  Pt's creatinine is 2.47. CT Abd shows extensive fatty liver. Placed call to A for admission. Decision time to admit: Now.     10:39 PM:  Discussed case with Dr. Victor, hospitalist. He will admit pt to a telemetry bed.           MEDICAL DECISION MAKING      MDM  Number of Diagnoses or Management Options  LOLI (acute kidney injury):   Alcohol-induced polyneuropathy:   Dehydration:      Amount and/or Complexity of Data Reviewed  Clinical lab tests: ordered and reviewed (Creatinine is 2.47. )  Tests in the radiology section of CPT®: ordered and reviewed (CT Head is negative acute.)  Decide to obtain previous medical records or to obtain history from someone other than the patient: yes  Review and summarize past medical records: yes (Pt was seen at his PMD's office in 05/2017 secondary to bilateral foot numbness. Pt had quit drinking ETOH at that time. Pt's creatinine was 1.22 about 7 months ago.)  Discuss the patient with other providers: yes (Case d/w Dr. Victor, hospitalist, who will  admit pt to a telemetry bed.   )    Patient Progress  Patient progress: stable             DIAGNOSIS  Final diagnoses:   Alcohol-induced polyneuropathy   LOLI (acute kidney injury)   Dehydration   Alcohol abuse         DISPOSITION  Pt admitted to telemetry.          Latest Documented Vital Signs:  As of 10:42 PM  BP- 146/99 HR- 86 Temp- 98.8 °F (37.1 °C) (Oral) O2 sat- 95%        --  Documentation assistance provided by jazzy Stewart for Dr. Heena MD.  Information recorded by the scribe was done at my direction and has been verified and validated by me.              Stanley Stewart  09/06/17 [id number]       Tang Naik MD  09/06/17 [id number]

## 2017-09-06 NOTE — ED TRIAGE NOTES
Pt reports bilat foot pain/numbness; family reports pt is unable to care for himself, drinking etoh daily

## 2017-09-07 ENCOUNTER — EPISODE CHANGES (OUTPATIENT)
Dept: CASE MANAGEMENT | Facility: OTHER | Age: 82
End: 2017-09-07

## 2017-09-07 PROBLEM — F10.939 ALCOHOL WITHDRAWAL (HCC): Status: ACTIVE | Noted: 2017-09-07

## 2017-09-07 LAB — GLUCOSE BLDC GLUCOMTR-MCNC: 107 MG/DL (ref 70–130)

## 2017-09-07 PROCEDURE — 82962 GLUCOSE BLOOD TEST: CPT

## 2017-09-07 PROCEDURE — 25010000002 THIAMINE PER 100 MG: Performed by: HOSPITALIST

## 2017-09-07 PROCEDURE — 25010000002 HEPARIN (PORCINE) PER 1000 UNITS: Performed by: INTERNAL MEDICINE

## 2017-09-07 PROCEDURE — 25010000002 ONDANSETRON PER 1 MG: Performed by: HOSPITALIST

## 2017-09-07 PROCEDURE — 25010000002 LORAZEPAM PER 2 MG: Performed by: HOSPITALIST

## 2017-09-07 PROCEDURE — 25010000002 MAGNESIUM SULFATE PER 500 MG OF MAGNESIUM: Performed by: HOSPITALIST

## 2017-09-07 PROCEDURE — 90791 PSYCH DIAGNOSTIC EVALUATION: CPT | Performed by: SOCIAL WORKER

## 2017-09-07 RX ORDER — ONDANSETRON 4 MG/1
4 TABLET, ORALLY DISINTEGRATING ORAL EVERY 6 HOURS PRN
Status: DISCONTINUED | OUTPATIENT
Start: 2017-09-07 | End: 2017-09-10 | Stop reason: HOSPADM

## 2017-09-07 RX ORDER — LORAZEPAM 2 MG/ML
0.5 INJECTION INTRAMUSCULAR
Status: DISCONTINUED | OUTPATIENT
Start: 2017-09-07 | End: 2017-09-10 | Stop reason: HOSPADM

## 2017-09-07 RX ORDER — LORAZEPAM 2 MG/ML
1 INJECTION INTRAMUSCULAR
Status: DISCONTINUED | OUTPATIENT
Start: 2017-09-07 | End: 2017-09-10 | Stop reason: HOSPADM

## 2017-09-07 RX ORDER — ONDANSETRON 2 MG/ML
4 INJECTION INTRAMUSCULAR; INTRAVENOUS EVERY 6 HOURS PRN
Status: DISCONTINUED | OUTPATIENT
Start: 2017-09-07 | End: 2017-09-10 | Stop reason: HOSPADM

## 2017-09-07 RX ORDER — HEPARIN SODIUM 5000 [USP'U]/ML
5000 INJECTION, SOLUTION INTRAVENOUS; SUBCUTANEOUS EVERY 12 HOURS SCHEDULED
Status: DISCONTINUED | OUTPATIENT
Start: 2017-09-07 | End: 2017-09-10 | Stop reason: HOSPADM

## 2017-09-07 RX ORDER — LORAZEPAM 1 MG/1
1 TABLET ORAL
Status: DISCONTINUED | OUTPATIENT
Start: 2017-09-07 | End: 2017-09-10 | Stop reason: HOSPADM

## 2017-09-07 RX ORDER — LORAZEPAM 0.5 MG/1
0.5 TABLET ORAL
Status: DISCONTINUED | OUTPATIENT
Start: 2017-09-07 | End: 2017-09-10 | Stop reason: HOSPADM

## 2017-09-07 RX ORDER — ESCITALOPRAM OXALATE 10 MG/1
10 TABLET ORAL NIGHTLY
Status: DISCONTINUED | OUTPATIENT
Start: 2017-09-07 | End: 2017-09-10 | Stop reason: HOSPADM

## 2017-09-07 RX ORDER — ONDANSETRON 4 MG/1
4 TABLET, FILM COATED ORAL EVERY 6 HOURS PRN
Status: DISCONTINUED | OUTPATIENT
Start: 2017-09-07 | End: 2017-09-10 | Stop reason: HOSPADM

## 2017-09-07 RX ORDER — MELATONIN
1000 DAILY
Status: DISCONTINUED | OUTPATIENT
Start: 2017-09-07 | End: 2017-09-10 | Stop reason: HOSPADM

## 2017-09-07 RX ORDER — LORAZEPAM 2 MG/ML
2 INJECTION INTRAMUSCULAR
Status: DISCONTINUED | OUTPATIENT
Start: 2017-09-07 | End: 2017-09-10 | Stop reason: HOSPADM

## 2017-09-07 RX ORDER — LORAZEPAM 1 MG/1
2 TABLET ORAL
Status: DISCONTINUED | OUTPATIENT
Start: 2017-09-07 | End: 2017-09-10 | Stop reason: HOSPADM

## 2017-09-07 RX ORDER — FAMOTIDINE 20 MG/1
20 TABLET, FILM COATED ORAL DAILY
Status: DISCONTINUED | OUTPATIENT
Start: 2017-09-07 | End: 2017-09-10 | Stop reason: HOSPADM

## 2017-09-07 RX ORDER — SODIUM CHLORIDE 0.9 % (FLUSH) 0.9 %
1-10 SYRINGE (ML) INJECTION AS NEEDED
Status: DISCONTINUED | OUTPATIENT
Start: 2017-09-07 | End: 2017-09-10 | Stop reason: HOSPADM

## 2017-09-07 RX ORDER — ATORVASTATIN CALCIUM 10 MG/1
10 TABLET, FILM COATED ORAL DAILY
Status: DISCONTINUED | OUTPATIENT
Start: 2017-09-07 | End: 2017-09-10 | Stop reason: HOSPADM

## 2017-09-07 RX ADMIN — FOLIC ACID 100 ML/HR: 5 INJECTION, SOLUTION INTRAMUSCULAR; INTRAVENOUS; SUBCUTANEOUS at 22:45

## 2017-09-07 RX ADMIN — LORAZEPAM 0.5 MG: 0.5 TABLET ORAL at 09:03

## 2017-09-07 RX ADMIN — FAMOTIDINE 20 MG: 20 TABLET, FILM COATED ORAL at 10:44

## 2017-09-07 RX ADMIN — LORAZEPAM 1 MG: 1 TABLET ORAL at 02:16

## 2017-09-07 RX ADMIN — LORAZEPAM 1 MG: 2 INJECTION INTRAMUSCULAR; INTRAVENOUS at 06:23

## 2017-09-07 RX ADMIN — ESCITALOPRAM 10 MG: 10 TABLET, FILM COATED ORAL at 20:05

## 2017-09-07 RX ADMIN — HEPARIN SODIUM 5000 UNITS: 5000 INJECTION, SOLUTION INTRAVENOUS; SUBCUTANEOUS at 11:03

## 2017-09-07 RX ADMIN — ONDANSETRON 4 MG: 2 INJECTION INTRAMUSCULAR; INTRAVENOUS at 17:53

## 2017-09-07 RX ADMIN — ATORVASTATIN CALCIUM 10 MG: 10 TABLET, FILM COATED ORAL at 10:44

## 2017-09-07 RX ADMIN — HEPARIN SODIUM 5000 UNITS: 5000 INJECTION, SOLUTION INTRAVENOUS; SUBCUTANEOUS at 20:05

## 2017-09-07 RX ADMIN — LORAZEPAM 0.5 MG: 0.5 TABLET ORAL at 18:01

## 2017-09-07 RX ADMIN — Medication: at 20:04

## 2017-09-07 RX ADMIN — VITAMIN D, TAB 1000IU (100/BT) 1000 UNITS: 25 TAB at 10:44

## 2017-09-07 RX ADMIN — LORAZEPAM 1 MG: 1 TABLET ORAL at 22:45

## 2017-09-07 RX ADMIN — ONDANSETRON 4 MG: 2 INJECTION INTRAMUSCULAR; INTRAVENOUS at 05:17

## 2017-09-07 NOTE — H&P
Internal medicine history and physical    INTERNAL MEDICINE   [facility]       Patient Identification:  Name: Neal Flores  Age: 82 y.o.  Sex: male  :  1935  MRN: [id number]                   Primary Care Physician: Praveen Cruz Jr., MD                                   Chief Complaint: Brought to the emergency room via EMS when apparently son-in-law called as he found him down in his home drunk.    History of Present Illness:   Source of information patient himself which is limited because of the fluctuating story as well as emergency room records.  Patient is a 82-year-old male who apparently lives by himself and admits to being an alcoholic claims that he has been on binge drinking for the last 3 weeks.  He had 1/5 of vodka last night and apparently fell and could not get up.  His son in law came to his house and found him in the situation and called EMS.    Patient is complaining of his nerves being shot and doesn't understand to get up and he falls down.  He says that he was laying around for almost 8 hours before ambulance was called.  He complains of pain in his both feet and legs.  He denies any fever or denies any chills denies any nausea or vomiting or abdominal pain.  He says that he is falling a lot lately.        Past Medical History:  Past Medical History:   Diagnosis Date   • Anxiety    • Arthritis    • Depression    • Hyperlipidemia    • Hypertension    • Kidney stone      Past Surgical History:  Past Surgical History:   Procedure Laterality Date   • LAMINECTOMY     • TONSILLECTOMY        Home Meds:  Prescriptions Prior to Admission   Medication Sig Dispense Refill Last Dose   • Cholecalciferol (VITAMIN D) 1000 UNITS tablet Take 1,000 Units by mouth Daily.   Past Month at Unknown time   • escitalopram (LEXAPRO) 20 MG tablet TAKE ONE TABLET BY MOUTH ONCE DAILY 90 tablet 1    • famotidine (PEPCID) 20 MG tablet Take 20 mg by mouth Daily.      • lisinopril (PRINIVIL,ZESTRIL)  "40 MG tablet Take 40 mg by mouth.   Taking   • simvastatin (ZOCOR) 10 MG tablet Take 1 tablet by mouth Every Night. 90 tablet 3      Current Meds:     Current Facility-Administered Medications:   •  LORazepam (ATIVAN) tablet 0.5 mg, 0.5 mg, Oral, Q2H PRN **OR** LORazepam (ATIVAN) injection 0.5 mg, 0.5 mg, Intravenous, Q2H PRN **OR** LORazepam (ATIVAN) tablet 1 mg, 1 mg, Oral, Q1H PRN, 1 mg at 09/07/17 0216 **OR** LORazepam (ATIVAN) injection 1 mg, 1 mg, Intravenous, Q1H PRN, 1 mg at 09/07/17 0623 **OR** LORazepam (ATIVAN) injection 1 mg, 1 mg, Intravenous, Q15 Min PRN **OR** LORazepam (ATIVAN) injection 1 mg, 1 mg, Intramuscular, Q15 Min PRN **OR** LORazepam (ATIVAN) tablet 2 mg, 2 mg, Oral, Q1H PRN **OR** LORazepam (ATIVAN) injection 2 mg, 2 mg, Intravenous, Q1H PRN, Bull Victor MD  •  multiple vitamin (M.V.I. Adult) 10 mL, thiamine (B-1) 100 mg, folic acid 1 mg, magnesium sulfate 2 g in sodium chloride 0.9 % 1,000 mL infusion, 100 mL/hr, Intravenous, Q24H, Bull Victor MD  •  ondansetron (ZOFRAN) tablet 4 mg, 4 mg, Oral, Q6H PRN **OR** ondansetron ODT (ZOFRAN-ODT) disintegrating tablet 4 mg, 4 mg, Oral, Q6H PRN **OR** ondansetron (ZOFRAN) injection 4 mg, 4 mg, Intravenous, Q6H PRN, Bull Victor MD, 4 mg at 09/07/17 0517  Allergies:  No Known Allergies  Social History:   Social History   Substance Use Topics   • Smoking status: Current Every Day Smoker     Packs/day: 1.00     Types: Cigarettes   • Smokeless tobacco: Never Used      Comment: 1 pack when binge drinking - 1/2 when not   • Alcohol use Yes      Comment: \"1/5 alcohol a day\" - binge drinks - none when not       Family History:  History reviewed. No pertinent family history.       Review of Systems  See history of present illness and past medical history.Limited as patient is at times changing his stories.  Following is the review system gathered by the ER physician.  Eyes: Negative for visual disturbance.   Respiratory: Negative for " "shortness of breath.    Cardiovascular: Negative for chest pain.   Musculoskeletal:        Pain of the feet bilaterally   Neurological: Positive for numbness (to the feet bilaterally). Negative for speech difficulty, weakness and headaches.   Vitals:   /72 (BP Location: Left arm, Patient Position: Lying)  Pulse 68  Temp 97.6 °F (36.4 °C) (Oral)   Resp 18  Ht 74\" (188 cm)  Wt 166 lb 14.4 oz (75.7 kg)  SpO2 94%  BMI 21.43 kg/m2  I/O:   Intake/Output Summary (Last 24 hours) at 09/07/17 0859  Last data filed at 09/07/17 0620   Gross per 24 hour   Intake                0 ml   Output              200 ml   Net             -200 ml     Exam:  General Appearance:    Awake interactive hard of hearing does not appear to be toxic or septic with appears completely disheveled and unkempt    Head:    Normocephalic, without obvious abnormality, atraumatic   Eyes:    PERRL, conjunctiva/corneas clear, EOM's intact, both eyes   Ears:    Normal external ear canals, both ears   Nose:   Nares normal, septum midline, mucosa normal, no drainage    or sinus tenderness   Throat:   Lips, tongue, gums normal; oral mucosa pink and moist   Neck:   Supple, symmetrical, trachea midline, no adenopathy;     thyroid:  no enlargement/tenderness/nodules; no carotid    bruit or JVD   Back:     Symmetric, no curvature, ROM normal, no CVA tenderness   Lungs:     Clear to auscultation bilaterally, respirations unlabored   Chest Wall:    No tenderness or deformity    Heart:    Regular rate and rhythm, S1 and S2 normal, no murmur, rub   or gallop   Abdomen:     Soft,Obese, non-tender, bowel sounds active all four quadrants,     no masses, no hepatomegaly, no splenomegaly   Extremities:   Extremities normal, atraumatic, no cyanosis or edema   Pulses:   Pulses palpable in all extremities; symmetric all extremities   Skin:   multiple skin excoriations and abrasions on leg and feet.  The most prominent area of scab was noted on the lateral aspect of " the right foot with mild surrounding erythema that is not tender.     Neurologic:   Weakness in lower extremities and altered sensation involving the legs.  Otherwise grossly nonfocal.       Data Review:      I reviewed the patient's new clinical results.    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       Plan:  The patient, continue IV thiamine and IV fluids as ordered by night attending, access evaluation, fall precautions and watch for alcohol withdrawal.  Consult CCP for proper placement for posthospital care.  We'll keep an eye on multiple abrasions on his feet with some of them are mild erythema around it.  He does not exhibit any obvious symptoms of sepsis and these lesions on the feet don't appear to be overtly cellulitic will not use any specific antibiotic therapy unless his condition changes.  We will watch his creatinine pattern and see if it improves with IV hydration, if it doesn't we'll consult nephrology.  Check bladder scan to make sure there is no element of urinary  retention contributing to the renal insufficiency.      Haja Vaca MD   9/7/2017  8:59 AM  Much of this encounter note is an electronic transcription/translation of spoken language to printed text. The electronic translation of spoken language may permit erroneous, or at times, nonsensical words or phrases to be inadvertently transcribed; Although I have reviewed the note for such errors, some may still exist

## 2017-09-07 NOTE — PLAN OF CARE
Problem: Patient Care Overview (Adult)  Goal: Plan of Care Review  Outcome: Ongoing (interventions implemented as appropriate)    09/07/17 0341   Coping/Psychosocial Response Interventions   Plan Of Care Reviewed With patient   Patient Care Overview   Progress no change   Outcome Evaluation   Outcome Summary/Follow up Plan Patient admitted with ETOH induced polyneuropathy. Alert and oriented. . Has multiple skin areas from previous fall. Complains of pain. LAINE núñezd. Had a bag of multivitamins IV. On CIWA protocol so far with a score of 7. Nursing will continue to monitor.       Goal: Adult Individualization and Mutuality  Outcome: Ongoing (interventions implemented as appropriate)  Goal: Discharge Needs Assessment  Outcome: Ongoing (interventions implemented as appropriate)    Problem: Renal Failure/Kidney Injury, Acute (Adult)  Goal: Signs and Symptoms of Listed Potential Problems Will be Absent or Manageable (Renal Failure/Kidney Injury, Acute)  Outcome: Ongoing (interventions implemented as appropriate)    Problem: Fall Risk (Adult)  Goal: Identify Related Risk Factors and Signs and Symptoms  Outcome: Ongoing (interventions implemented as appropriate)  Goal: Absence of Falls  Outcome: Ongoing (interventions implemented as appropriate)    Problem: Pain, Acute (Adult)  Goal: Identify Related Risk Factors and Signs and Symptoms  Outcome: Ongoing (interventions implemented as appropriate)  Goal: Acceptable Pain Control/Comfort Level  Outcome: Ongoing (interventions implemented as appropriate)

## 2017-09-07 NOTE — ED NOTES
"This RN spoke on the phone with pt's daughter, Romelia at this time. She reports pt has been drinking \"2 fifths of alcohol over the past month. He has people bringing him alcohol.\" Daughter informed that pt is stable at this time.      [place]y [place] RN  09/06/17 0581    "

## 2017-09-07 NOTE — NURSING NOTE
09/07/17 1400   Pressure Ulcer 09/07/17 0000 Right lateral other (see comments)   Date first assessed/Time first assessed: 09/07/17 0000   Present On Admission (Pressure Ulcer): yes  Side: Right  Orientation: lateral  Location: (c) other (see comments)   Dressing Appearance other (see comments)  (ezequiel)   Pressure Ulcer Appearance black eschar   Periwound Area redness;swelling   Length (Pressure Ulcer) (cm) 1   Width (Pressure Ulcer) (cm) 1   Periwound Care topical treatment applied  (IODOSORB GEL ORDERED.)   Patient with history of multiple falls and alcohol abuse.  Scattered scabs and abrasions all over body.  Most notable area is to right outer foot, area of black eschar with redness surrounding.  Pt reports it is tender to touch.  I will order Iodosorb gel to see if this pulls out any moisture, lessening the erythema.  I marked the redness to determine if area is getting larger or smaller.  Will follow along.  Not on antibiotics.

## 2017-09-07 NOTE — DISCHARGE PLACEMENT REQUEST
"Bk Clemons (82 y.o. Male)     Date of Birth Social Security Number Address Home Phone MRN    1935  [address] [id number] [phone] [id number]    Jainism Marital Status          None Unknown       Admission Date Admission Type Admitting Provider Attending Provider Department, Room/Bed    9/6/17 Emergency Allen Sheikh MD Edling, Stephen A, MD [address] [address] [address], 648/1    Discharge Date Discharge Disposition Discharge Destination                      Attending Provider: Allen Sheikh MD     Allergies:  No Known Allergies    Isolation:  None   Infection:  None   Code Status:  FULL    Ht:  74\" (188 cm)   Wt:  166 lb 14.4 oz (75.7 kg)    Admission Cmt:  None   Principal Problem:  LOLI (acute kidney injury) [N17.9]                 Active Insurance as of 9/6/2017     Primary Coverage     Payor Plan Insurance Group Employer/Plan Group    MEDICARE MEDICARE A & B      Payor Plan Address Payor Plan Phone Number Effective From Effective To    [address] [phone] 4/1/2000     [place] [address]       Subscriber Name Subscriber Birth Date Member ID       BK CLEMONS 1935 [id number]           Secondary Coverage     Payor Plan Insurance Group Employer/Plan Group    [facility] SUPP [id number]     Payor Plan Address Payor Plan Phone Number Effective From Effective To    [address]  12/1/2016     [place] [address]       Subscriber Name Subscriber Birth Date Member ID       BK CLEMONS 1935 [id number]                 Emergency Contacts      (Rel.) Home Phone Work Phone Mobile Phone    Elzbieta Dutton (Daughter) [phone] -- --            "

## 2017-09-07 NOTE — PLAN OF CARE
Problem: Patient Care Overview (Adult)  Goal: Plan of Care Review  Outcome: Ongoing (interventions implemented as appropriate)    09/07/17 1552   Coping/Psychosocial Response Interventions   Plan Of Care Reviewed With patient   Patient Care Overview   Progress no change   Outcome Evaluation   Outcome Summary/Follow up Plan Patient admitted with ETOH induced withdrawl. Patient has had multiple falls at home. wound on right foot. Ongoing CIWA protocol last score was 3. Nursing will continue to monitor.          Problem: Renal Failure/Kidney Injury, Acute (Adult)  Goal: Signs and Symptoms of Listed Potential Problems Will be Absent or Manageable (Renal Failure/Kidney Injury, Acute)  Outcome: Ongoing (interventions implemented as appropriate)    Problem: Fall Risk (Adult)  Goal: Identify Related Risk Factors and Signs and Symptoms  Outcome: Ongoing (interventions implemented as appropriate)  Goal: Absence of Falls  Outcome: Ongoing (interventions implemented as appropriate)    Problem: Pain, Acute (Adult)  Goal: Identify Related Risk Factors and Signs and Symptoms  Outcome: Ongoing (interventions implemented as appropriate)  Goal: Acceptable Pain Control/Comfort Level  Outcome: Ongoing (interventions implemented as appropriate)    Problem: Acute Alcohol Withdrawal Syndrome, Risk For/Actual (Adult)  Goal: Signs and Symptoms of Listed Potential Problems Will be Absent or Manageable (Acute Alcohol Withdrawal Syndrome, Risk For/Actual)  Outcome: Ongoing (interventions implemented as appropriate)

## 2017-09-07 NOTE — PROGRESS NOTES
Discharge Planning Assessment  [facility]     Patient Name: Neal Flores  MRN: [id number]  Today's Date: 9/7/2017    Admit Date: 9/6/2017          Discharge Needs Assessment       09/07/17 1610    Living Environment    Lives With alone    Living Arrangements house    Home Accessibility stairs to enter home    Transportation Available car;family or friend will provide    Living Environment    Quality Of Family Relationships supportive    Able to Return to Prior Living Arrangements yes    Discharge Needs Assessment    Concerns To Be Addressed basic needs concerns    Equipment Currently Used at Home walker, rolling    Equipment Needed After Discharge none            Discharge Plan       09/07/17 1627    Case Management/Social Work Plan    Plan Home with [facility]    Additional Comments Spoke with patient at bedside, face sheet verified. Patient lives alone in a single level house with a basement. Patient has a rolling walker. Patient stated he has a house keeper that comes 2 times a week. Patient has used [facility] for rehab in the past and would return again if needed. Patient denies the need for rehab at this time. Patient stated he has used [facility] but is not current with them. Patient is agreeable to a referral for Home Health for a safety visit and possible wound care. Call placed to [facility]. Melissa Pretty RN      09/07/17 1611    Case Management/Social Work Plan    Plan Home with [facility]         Discharge Placement     Facility/Agency Request Status Selected? Address Phone Number Fax Number    [facility] Pending - Request Sent     [address], [address] [address] [phone]                Demographic Summary       09/07/17 1605    Referral Information    Admission Type inpatient    Arrived From home or self-care    Primary Care Physician Information    Name Praveen Cruz MD    Phone [phone]            Functional  Status       09/07/17 1608    Functional Status Current    Ambulation 2-->assistive person    Transferring 2-->assistive person    Toileting 2-->assistive person    Bathing 2-->assistive person    Dressing 2-->assistive person    Eating 0-->independent    Communication 0-->understands/communicates without difficulty    Swallowing (if score 2 or more for any item, consult Rehab Services) 0-->swallows foods/liquids without difficulty    Functional Status Prior    Ambulation 0-->independent    Transferring 0-->independent    Toileting 0-->independent    Bathing 0-->independent    Dressing 0-->independent    Eating 0-->independent    Communication 0-->understands/communicates without difficulty    Swallowing 0-->swallows foods/liquids without difficulty    IADL    Medications independent    Meal Preparation independent    Housekeeping independent    Laundry independent    Shopping independent    Oral Care independent            Psychosocial     None            Abuse/Neglect     None            Legal     None            Substance Abuse     None            Patient Forms     None          Melissa Pretty, RN

## 2017-09-07 NOTE — CONSULTS
"83 y/o cauc  father brought to the ED by EMS after family found him down at home.  Patient reports that he \"binge drinks\" and had been drinking a pint to a fifth of whiskey / day for past 3 weeks.  He reports that he had gone several months w/o any ETOH prior to that.  He reports he has decreased appetite when he drinks and increases his tobacco smoking when drinking.  He states he has lost some weight recently.  Patient reports long term issues w/ sleep primarily b/c he worked at night.      Patient denes any current mental health providers.  He denies any IP psych treatment. He has been to [facility] and to a hospital in [place] whose name he can't remember in the distant past.  He attended AA years ago.  Patient states he first drank w/ peers approx 17.  He tried marijuana a couple of times but never used it on a regular dasis.  He alisa use of amph/methamphetamines, cocaine or hallucinogens.  He denies use of heroin.  He only used opiates when prescribed.He has attended AA meetings in the distant past and says they did not do anything for him.     Patient is a retired fork .  His wife  in .  He has 3 adult children.  He states he has a female friend that may move in w/ him  He states she is 70  Yrs. Y/o.      Patient is alert and O X 4.  He is lying quietly in bed.  He denies any a/v hallucinations.  No SI of HI.  He states \"I want to live.\"  when ask about craving for ETOH he said no \"I'll die.\"      Discussed case w/ RN. [facility] will follow pt.  He is not sure he wants any ETOH treatment.   "

## 2017-09-07 NOTE — PROGRESS NOTES
"Adult Nutrition  Assessment/PES    Patient Name:  Neal Flores  YOB: 1935  MRN: [id number]  Admit Date:  9/6/2017    Assessment Date:  9/7/2017     Comments:  Placed orders for re-weight d/t large discrepancy between today's weight and yesterday's weight.  Will await current weight in order to further assess patient.  Patient reports UBW around 220# and thinks he weighed that around 6 months ago.  Reports decreased PO over the past few weeks d/t drinking binge.  ETOH abuse.  100% x 1 meal so far, reports pretty good appetite.  Reports drinking 3 Ensures or Boosts per day at home, says he orders them off of [employer].      Will continue to follow for updated weight to further assess patient, most likely needs MSA.            Reason for Assessment       09/07/17 1609    Reason for Assessment    Reason For Assessment/Visit skin risk    Diagnosis Diagnosis    Cardiac HTN    Ortho Osteoarthritis    Psychosocial Depression    Skin Pressure ulcer    Substance Use ETOH              Nutrition/Diet History       09/07/17 1609    Nutrition/Diet History    Typical Food/Fluid Intake reports poor intake over past few weeks d/t drinking binge; reports appetite is pretty good now    Supplemental Drinks/Foods/Additives drinks 3 Boost or Ensures per day            Anthropometrics       09/07/17 1610    Anthropometrics    Height 188 cm (74.02\")    Weight 75.7 kg (166 lb 14.2 oz)   190# yesterday    Ideal Body Weight (IBW)    Ideal Body Weight (IBW), Male (kg) 87.7    % Ideal Body Weight 86.5    Usual Body Weight (UBW)    Usual Body Weight 99.8 kg (220 lb)   per patient report, unsure if current weight is accurate    % Usual Body Weight 75.86    Weight Loss Time Frame patient reports probable weight loss over the past 6 months, awaiting re-weight in order to further assess d/t discrepancy between todays weight and yesterdays    Body Mass Index (BMI)    BMI (kg/m2) 21.46    BMI Grade 19.1 - 24.9 - normal          " "  Labs/Tests/Procedures/Meds       09/07/17 1611    Labs/Tests/Procedures/Meds    Diagnostic Test/Procedure Review reviewed, pertinent    Labs/Tests Review Reviewed;Glucose;CO2;Cl-;Creat;GFR;ALT;AST;Alb    Medication Review Reviewed, pertinent;Insulin;Antacid;Multivitamin   vit D3, thiamine, folic acid, MgSO4    Significant Vitals reviewed, pertinent            Physical Findings       09/07/17 1612    Physical Findings/Assessment    Additional Documentation Physical Appearance (Group)    Physical Appearance    Skin pressure ulcer(s)   R lateral foot PU, coccyx excoriation, B=17            Estimated/Assessed Needs       09/07/17 1614    Calculation Measurements    Weight Used For Calculations 75.7 kg (166 lb 14.2 oz)    Height Used for Calculations 1.88 m (6' 2.02\")    Estimated/Assessed Energy Needs    Energy Need Method Kcal/kg    kcal/kg 30    30 Kcal/Kg (kcal) 2271    Estimated Kcal Range  2271    Estimated/Assessed Protein Needs    Weight Used for Protein Calculation 75.7 kg (166 lb 14.2 oz)    Protein (gm/kg) 1.2    1.2 Gm Protein (gm) 90.84    Estimated Protein Range 91    Estimated/Assessed Fluid Needs    Fluid Need Method RDA method    RDA Method (mL)  2271            Nutrition Prescription Ordered       09/07/17 1613    Nutrition Prescription PO    Current PO Diet Regular            Evaluation of Received Nutrient/Fluid Intake       09/07/17 1614    PO Evaluation    Number of Meals 1    % PO Intake 100              Problem/Interventions:        Problem 1       09/07/17 1616    Nutrition Diagnoses Problem 1    Problem 1 Inadequate Intake/Infusion    Inadequate Intake Type Oral    Macronutrient Kcal;Protein    Etiology (related to) Medical Diagnosis    Substance Use ETOH    Signs/Symptoms (evidenced by) Report/Observation                    Intervention Goal       09/07/17 1616    Intervention Goal    General Maintain nutrition    PO Maintain intake    Weight No significant weight loss            Nutrition " Intervention       09/07/17 1616    Nutrition Intervention    RD/Tech Action Follow Tx progress;Care plan reviewd;Encourage intake;Recommend/ordered    Recommended/Ordered Supplement            Nutrition Prescription       09/07/17 1617    Nutrition Prescription PO    PO Prescription Begin/change supplement    Supplement Ensure Enlive    Supplement Frequency 2 times a day    New PO Prescription Ordered? Yes            Education/Evaluation       09/07/17 1617    Education    Education Will Instruct as appropriate    Monitor/Evaluation    Monitor Per protocol;PO intake;Supplement intake;Weight;Skin status            Electronically signed by:  Hilaria Alvarez RD  09/07/17 4:17 PM

## 2017-09-07 NOTE — PROGRESS NOTES
Discharge Planning Assessment  [facility]     Patient Name: Neal Flores  MRN: [id number]  Today's Date: 9/7/2017    Admit Date: 9/6/2017          Discharge Needs Assessment       09/07/17 1610    Living Environment    Lives With alone    Living Arrangements house    Home Accessibility stairs to enter home    Transportation Available car;family or friend will provide    Living Environment    Quality Of Family Relationships supportive    Able to Return to Prior Living Arrangements yes    Discharge Needs Assessment    Concerns To Be Addressed basic needs concerns    Equipment Currently Used at Home walker, rolling    Equipment Needed After Discharge none            Discharge Plan       09/07/17 1639    Case Management/Social Work Plan    Plan Home with [facility]    Additional Comments Inbound call received from [facility] they will accept patient. Melissa Pretty RN      09/07/17 1621    Case Management/Social Work Plan    Plan Home with [facility]    Additional Comments Spoke with patient at bedside, face sheet verified. Patient lives alone in a single level house with a basement. Patient has a rolling walker. Patient stated he has a house keeper that comes 2 times a week. Patient has used [facility] for rehab in the past and would return again if needed. Patient denies the need for rehab at this time. Patient stated he has used [facility] but is not current with them. Patient is agreeable to a referral for Home Health for a safety visit and possible wound care. Call placed to [facility]. Melissa Pretty RN      09/07/17 5296    Case Management/Social Work Plan    Plan Home with [facility]         Discharge Placement     Facility/Agency Request Status Selected? Address Phone Number Fax Number    [facility] Accepted    Yes [address], [address] [address] [phone]                Demographic  Summary       09/07/17 1606    Referral Information    Admission Type inpatient    Arrived From home or self-care    Primary Care Physician Information    Name Praveen rCuz MD    Phone [phone]            Functional Status       09/07/17 1608    Functional Status Current    Ambulation 2-->assistive person    Transferring 2-->assistive person    Toileting 2-->assistive person    Bathing 2-->assistive person    Dressing 2-->assistive person    Eating 0-->independent    Communication 0-->understands/communicates without difficulty    Swallowing (if score 2 or more for any item, consult Rehab Services) 0-->swallows foods/liquids without difficulty    Functional Status Prior    Ambulation 0-->independent    Transferring 0-->independent    Toileting 0-->independent    Bathing 0-->independent    Dressing 0-->independent    Eating 0-->independent    Communication 0-->understands/communicates without difficulty    Swallowing 0-->swallows foods/liquids without difficulty    IADL    Medications independent    Meal Preparation independent    Housekeeping independent    Laundry independent    Shopping independent    Oral Care independent            Psychosocial     None            Abuse/Neglect     None            Legal     None            Substance Abuse     None            Patient Forms     None          Melissa Pretty, RN

## 2017-09-08 LAB
ALBUMIN SERPL-MCNC: 2.4 G/DL (ref 3.5–5.2)
ALBUMIN/GLOB SERPL: 0.9 G/DL
ALP SERPL-CCNC: 77 U/L (ref 39–117)
ALT SERPL W P-5'-P-CCNC: 41 U/L (ref 1–41)
ANION GAP SERPL CALCULATED.3IONS-SCNC: 10.3 MMOL/L
AST SERPL-CCNC: 42 U/L (ref 1–40)
BACTERIA SPEC AEROBE CULT: NORMAL
BASOPHILS # BLD AUTO: 0.01 10*3/MM3 (ref 0–0.2)
BASOPHILS NFR BLD AUTO: 0.1 % (ref 0–1.5)
BILIRUB SERPL-MCNC: 1 MG/DL (ref 0.1–1.2)
BILIRUB UR QL STRIP: NEGATIVE
BUN BLD-MCNC: 18 MG/DL (ref 8–23)
BUN/CREAT SERPL: 14.1 (ref 7–25)
CALCIUM SPEC-SCNC: 8.2 MG/DL (ref 8.6–10.5)
CHLORIDE SERPL-SCNC: 98 MMOL/L (ref 98–107)
CLARITY UR: CLEAR
CO2 SERPL-SCNC: 27.7 MMOL/L (ref 22–29)
COLOR UR: ABNORMAL
CREAT BLD-MCNC: 1.28 MG/DL (ref 0.76–1.27)
DEPRECATED RDW RBC AUTO: 72.7 FL (ref 37–54)
EOSINOPHIL # BLD AUTO: 0.02 10*3/MM3 (ref 0–0.7)
EOSINOPHIL NFR BLD AUTO: 0.3 % (ref 0.3–6.2)
ERYTHROCYTE [DISTWIDTH] IN BLOOD BY AUTOMATED COUNT: 22.4 % (ref 11.5–14.5)
GFR SERPL CREATININE-BSD FRML MDRD: 54 ML/MIN/1.73
GLOBULIN UR ELPH-MCNC: 2.7 GM/DL
GLUCOSE BLD-MCNC: 101 MG/DL (ref 65–99)
GLUCOSE UR STRIP-MCNC: NEGATIVE MG/DL
HCT VFR BLD AUTO: 35.3 % (ref 40.4–52.2)
HGB BLD-MCNC: 11.4 G/DL (ref 13.7–17.6)
HGB UR QL STRIP.AUTO: NEGATIVE
IMM GRANULOCYTES # BLD: 0 10*3/MM3 (ref 0–0.03)
IMM GRANULOCYTES NFR BLD: 0 % (ref 0–0.5)
INR PPP: 1.1 (ref 0.9–1.1)
KETONES UR QL STRIP: ABNORMAL
LEUKOCYTE ESTERASE UR QL STRIP.AUTO: NEGATIVE
LIPASE SERPL-CCNC: 26 U/L (ref 13–60)
LYMPHOCYTES # BLD AUTO: 0.81 10*3/MM3 (ref 0.9–4.8)
LYMPHOCYTES NFR BLD AUTO: 10.7 % (ref 19.6–45.3)
MCH RBC QN AUTO: 28.9 PG (ref 27–32.7)
MCHC RBC AUTO-ENTMCNC: 32.3 G/DL (ref 32.6–36.4)
MCV RBC AUTO: 89.4 FL (ref 79.8–96.2)
MONOCYTES # BLD AUTO: 0.75 10*3/MM3 (ref 0.2–1.2)
MONOCYTES NFR BLD AUTO: 9.9 % (ref 5–12)
NEUTROPHILS # BLD AUTO: 5.96 10*3/MM3 (ref 1.9–8.1)
NEUTROPHILS NFR BLD AUTO: 79 % (ref 42.7–76)
NITRITE UR QL STRIP: NEGATIVE
PH UR STRIP.AUTO: 5.5 [PH] (ref 5–8)
PLATELET # BLD AUTO: 108 10*3/MM3 (ref 140–500)
PMV BLD AUTO: 10.4 FL (ref 6–12)
POTASSIUM BLD-SCNC: 3.4 MMOL/L (ref 3.5–5.2)
PROT SERPL-MCNC: 5.1 G/DL (ref 6–8.5)
PROT UR QL STRIP: NEGATIVE
PROTHROMBIN TIME: 13.8 SECONDS (ref 11.7–14.2)
RBC # BLD AUTO: 3.95 10*6/MM3 (ref 4.6–6)
SODIUM BLD-SCNC: 136 MMOL/L (ref 136–145)
SP GR UR STRIP: 1.02 (ref 1–1.03)
TSH SERPL DL<=0.05 MIU/L-ACNC: 1.24 MIU/ML (ref 0.27–4.2)
UROBILINOGEN UR QL STRIP: ABNORMAL
WBC NRBC COR # BLD: 7.55 10*3/MM3 (ref 4.5–10.7)

## 2017-09-08 PROCEDURE — 25010000002 HEPARIN (PORCINE) PER 1000 UNITS: Performed by: INTERNAL MEDICINE

## 2017-09-08 PROCEDURE — 83690 ASSAY OF LIPASE: CPT | Performed by: INTERNAL MEDICINE

## 2017-09-08 PROCEDURE — 85610 PROTHROMBIN TIME: CPT | Performed by: INTERNAL MEDICINE

## 2017-09-08 PROCEDURE — 84443 ASSAY THYROID STIM HORMONE: CPT | Performed by: INTERNAL MEDICINE

## 2017-09-08 PROCEDURE — 94799 UNLISTED PULMONARY SVC/PX: CPT

## 2017-09-08 PROCEDURE — 85025 COMPLETE CBC W/AUTO DIFF WBC: CPT | Performed by: INTERNAL MEDICINE

## 2017-09-08 PROCEDURE — 92610 EVALUATE SWALLOWING FUNCTION: CPT

## 2017-09-08 PROCEDURE — 25010000002 THIAMINE PER 100 MG: Performed by: HOSPITALIST

## 2017-09-08 PROCEDURE — 81003 URINALYSIS AUTO W/O SCOPE: CPT | Performed by: HOSPITALIST

## 2017-09-08 PROCEDURE — 80053 COMPREHEN METABOLIC PANEL: CPT | Performed by: INTERNAL MEDICINE

## 2017-09-08 PROCEDURE — 25010000002 MAGNESIUM SULFATE PER 500 MG OF MAGNESIUM: Performed by: HOSPITALIST

## 2017-09-08 PROCEDURE — 97162 PT EVAL MOD COMPLEX 30 MIN: CPT

## 2017-09-08 RX ORDER — POTASSIUM CHLORIDE 750 MG/1
40 CAPSULE, EXTENDED RELEASE ORAL ONCE
Status: COMPLETED | OUTPATIENT
Start: 2017-09-08 | End: 2017-09-08

## 2017-09-08 RX ADMIN — FAMOTIDINE 20 MG: 20 TABLET, FILM COATED ORAL at 07:42

## 2017-09-08 RX ADMIN — VITAMIN D, TAB 1000IU (100/BT) 1000 UNITS: 25 TAB at 07:42

## 2017-09-08 RX ADMIN — Medication: at 07:43

## 2017-09-08 RX ADMIN — LORAZEPAM 0.5 MG: 0.5 TABLET ORAL at 02:06

## 2017-09-08 RX ADMIN — LORAZEPAM 0.5 MG: 0.5 TABLET ORAL at 12:10

## 2017-09-08 RX ADMIN — HEPARIN SODIUM 5000 UNITS: 5000 INJECTION, SOLUTION INTRAVENOUS; SUBCUTANEOUS at 20:54

## 2017-09-08 RX ADMIN — ESCITALOPRAM 10 MG: 10 TABLET, FILM COATED ORAL at 20:54

## 2017-09-08 RX ADMIN — POTASSIUM CHLORIDE 40 MEQ: 750 CAPSULE, EXTENDED RELEASE ORAL at 12:24

## 2017-09-08 RX ADMIN — ATORVASTATIN CALCIUM 10 MG: 10 TABLET, FILM COATED ORAL at 07:42

## 2017-09-08 RX ADMIN — HEPARIN SODIUM 5000 UNITS: 5000 INJECTION, SOLUTION INTRAVENOUS; SUBCUTANEOUS at 07:42

## 2017-09-08 RX ADMIN — FOLIC ACID 100 ML/HR: 5 INJECTION, SOLUTION INTRAMUSCULAR; INTRAVENOUS; SUBCUTANEOUS at 20:54

## 2017-09-08 NOTE — THERAPY EVALUATION
Acute Care - Physical Therapy Initial Evaluation  [facility]     Patient Name: Neal Flores  : 1935  MRN: [id number]  Today's Date: 2017   Onset of Illness/Injury or Date of Surgery Date: 17  Date of Referral to PT: 17  Referring Physician: Kori      Admit Date: 2017     Visit Dx:    ICD-10-CM ICD-9-CM   1. LOLI (acute kidney injury) N17.9 584.9   2. 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 Dehydration E86.0 276.51   4. Alcohol abuse F10.10 305.00   5. Impaired gait and mobility R26.89 781.2     Patient Active Problem List   Diagnosis   • Benign essential hypertension   • Hypercholesterolemia   • Alcoholism   • PAF (paroxysmal atrial fibrillation)   • Renal stones   • H/O abdominal aortic aneurysm repair   • History of deep vein thrombophlebitis of lower extremity   • Chronic anxiety   • Chronic depression   • H/O traumatic subdural hematoma   • Acute appendicitis with appendiceal abscess   • Recurrent falls   • Multiple pulmonary nodules   • Syncope   • Fracture of multiple ribs   • Hypothyroidism   • Deep vein thrombosis   • Degeneration of intervertebral disc of lumbar region   • Alcohol-induced polyneuropathy   • [place]   • LOLI (acute kidney injury)   • Alcohol withdrawal     Past Medical History:   Diagnosis Date   • Alcohol abuse    • Anxiety    • Arthritis    • Depression    • Hyperlipidemia    • Hypertension    • Kidney stone      Past Surgical History:   Procedure Laterality Date   • LAMINECTOMY     • TONSILLECTOMY            PT ASSESSMENT (last 72 hours)      PT Evaluation       17 1434 17 1610    Rehab Evaluation    Document Type evaluation  -     Subjective Information agree to therapy  -     Patient Effort, Rehab Treatment good  -     Symptoms Noted During/After Treatment none  -     General Information    Patient Profile Review yes  -     Onset of Illness/Injury or Date of Surgery Date 17  -     Referring Physician Kori  -      General Observations seated in chair no acute distress, alarm in place  -     Pertinent History Of Current Problem ETOH polyneuropathy  -     Precautions/Limitations fall precautions  -     Prior Level of Function independent:  -     Equipment Currently Used at Home walker, rolling  - walker, rolling  -PT    Plans/Goals Discussed With patient  -     Risks Reviewed patient:  -     Benefits Reviewed patient:  -     Living Environment    Lives With  alone  -PT    Living Arrangements  house  -PT    Home Accessibility  stairs to enter home  -PT    Transportation Available  car;family or friend will provide  -PT    Clinical Impression    Date of Referral to PT 09/08/17  -     Criteria for Skilled Therapeutic Interventions Met yes  -     Pathology/Pathophysiology Noted (Describe Specifically for Each System) musculoskeletal;neuromuscular  -     Impairments Found (describe specific impairments) gait, locomotion, and balance;joint integrity and mobility;arousal, attention, and cognition  -     Functional Limitations in Following Categories (Describe Specific Limitations) self-care;home management  -     Rehab Potential good, to achieve stated therapy goals  -     Pain Assessment    Pain Assessment No/denies pain  -     Vision Assessment/Intervention    Visual Impairment WNL  -     Cognitive Assessment/Intervention    Current Cognitive/Communication Assessment functional  -     Orientation Status oriented x 4  -     Follows Commands/Answers Questions 100% of the time;needs cueing  -     Personal Safety WNL/WFL  -     ROM (Range of Motion)    General ROM no range of motion deficits identified  -     MMT (Manual Muscle Testing)    General MMT Assessment no strength deficits identified  -     General MMT Assessment Detail BLEs grossly at least 3/5  -     Bed Mobility, Assessment/Treatment    Bed Mob, Supine to Sit, [place] not tested  -     Bed Mob, Sit to Supine, [place]  minimum assist (75% patient effort)  -     Transfer Assessment/Treatment    Transfers, Sit-Stand [place] moderate assist (50% patient effort);minimum assist (75% patient effort)  -     Transfers, Stand-Sit [place] minimum assist (75% patient effort);moderate assist (50% patient effort);verbal cues required  -     Transfers, Sit-Stand-Sit, Assist Device rolling walker  -     Gait Assessment/Treatment    Gait, [place] Level minimum assist (75% patient effort);verbal cues required;nonverbal cues required (demo/gesture)  -     Gait, Assistive Device rolling walker  -     Gait, Distance (Feet) 75  -     Gait, Gait Pattern Analysis swing-through gait  -     Gait, Gait Deviations bilateral:;[place] decreased;ataxia;decreased heel strike;forward flexed posture  -     Gait, Safety Issues weight-shifting ability decreased;step length decreased;sequencing ability decreased;balance decreased during turns  -     Gait, Impairments impaired balance;coordination impaired;motor control impaired;postural control impaired  -     Positioning and Restraints    Pre-Treatment Position sitting in chair/recliner  -     Post Treatment Position bed  -     In Bed supine;call light within reach;encouraged to call for assist;exit alarm on;notified [facility]  -       09/07/17 1400 09/06/17 2348    Living Environment    Lives With alone  - alone  -DA    Living Arrangements house  - house  -DA    Home Accessibility  stairs within home;stairs to enter home  -DA    Number of Stairs to Enter Home  2  -DA    Number of Stairs Within Home  --   doesn't use  -DA    Stair Railings at Home  outside, present on right side  -DA    Type of Financial/Environmental Concern  none  -DA    Transportation Available  car  -DA      09/06/17 2342       General Information    Equipment Currently Used at Home walker, rolling  -DA       User Key  (r) = Recorded By, (t) = Taken By, (c) = Cosigned By    Initials Name Provider Type      Lubna Muhammad, PT Physical Therapist    ALEX Guallpa, RN Registered Nurse    PT Melissa Pretty, RN Case Manager    RUFINA Bernstein Therapist          Physical Therapy Education     Title: PT OT SLP Therapies (Done)     Topic: Physical Therapy (Done)     Point: Mobility training (Done)    Learning Progress Summary    Learner Readiness Method Response Comment Documented by Status   Patient Acceptance E VU,NR   09/08/17 1439 Done               Point: Home exercise program (Done)    Learning Progress Summary    Learner Readiness Method Response Comment Documented by Status   Patient Acceptance E VU,NR   09/08/17 1439 Done               Point: Body mechanics (Done)    Learning Progress Summary    Learner Readiness Method Response Comment Documented by Status   Patient Acceptance E VU,NR   09/08/17 1439 Done               Point: Precautions (Done)    Learning Progress Summary    Learner Readiness Method Response Comment Documented by Status   Patient Acceptance E VU,NR   09/08/17 1439 Done                      User Key     Initials Effective Dates Name Provider Type Discipline     02/07/17 -  Lubna Muhammad, PT Physical Therapist PT                PT Recommendation and Plan  Anticipated Discharge Disposition: skilled nursing facility  Planned Therapy Interventions: balance training, bed mobility training, gait training, home exercise program, ROM (Range of Motion), strengthening, stretching, transfer training  PT Frequency: daily  Plan of Care Review  Plan Of Care Reviewed With: patient  Outcome Summary/Follow up Plan: pt presents w. generalized weakness 2' med status, ETOH polyneuropathy, good tolerance to household distance ambulation rwx - some ataxia and balance impairements, fwd posturing, may benefit from skilled PT acutely to address functional deficits and assist with planning.           IP PT Goals       09/08/17 1440          Bed Mobility PT LTG    Bed Mobility PT LTG, Date Established 09/08/17   -LH      Bed Mobility PT LTG, Time to Achieve 1 wk  -LH      Bed Mobility PT LTG, Activity Type all bed mobility  -LH      Bed Mobility PT LTG, [place] Level supervision required  -LH      Transfer Training PT LTG    Transfer Training PT LTG, Date Established 09/08/17  -      Transfer Training PT LTG, Time to Achieve 1 wk  -LH      Transfer Training PT LTG, Activity Type all transfers  -LH      Transfer Training PT LTG, [place] Level contact guard assist  -LH      Transfer Training PT LTG, Assist Device walker, rolling  -LH      Gait Training PT LTG    Gait Training Goal PT LTG, Date Established 09/08/17  -LH      Gait Training Goal PT LTG, Time to Achieve 1 wk  -LH      Gait Training Goal PT LTG, [place] Level contact guard assist  -LH      Gait Training Goal PT LTG, Assist Device walker, rolling  -LH      Gait Training Goal PT LTG, Distance to Achieve 150  -LH        User Key  (r) = Recorded By, (t) = Taken By, (c) = Cosigned By    Initials Name Provider Type     Lubna Muhammad PT Physical Therapist                Outcome Measures       09/08/17 1400          How much help from another person do you currently need...    Turning from your back to your side while in flat bed without using bedrails? 3  -LH      Moving from lying on back to sitting on the side of a flat bed without bedrails? 3  -LH      Moving to and from a bed to a chair (including a wheelchair)? 3  -LH      Standing up from a chair using your arms (e.g., wheelchair, bedside chair)? 2  -LH      Climbing 3-5 steps with a railing? 2  -LH      To walk in hospital room? 2  -LH      AM-Merged with [facility] 6 Clicks Score 15  -      Functional Assessment    Outcome Measure Options AM-PAC 6 Clicks Basic Mobility (PT)  -        User Key  (r) = Recorded By, (t) = Taken By, (c) = Cosigned By    Initials Name Provider Type     Lubna Muhammad PT Physical Therapist           Time Calculation:         PT Charges       09/08/17 0393          Time Calculation     Start Time 1400  -      Stop Time 1415  -      Time Calculation (min) 15 min  -      PT Received On 09/08/17  -      PT - Next Appointment 09/09/17  -      PT Goal Re-Cert Due Date 09/15/17  -        User Key  (r) = Recorded By, (t) = Taken By, (c) = Cosigned By    Initials Name Provider Type     Lubna Muhammad, PT Physical Therapist          Therapy Charges for Today     Code Description Service Date Service Provider Modifiers Qty    13915499282 HC PT EVAL MOD COMPLEXITY 2 9/8/2017 Lubna Muhammad, PT GP 1          PT G-Codes  Outcome Measure Options: AM-PAC 6 Clicks Basic Mobility (PT)      Lubna Muhammad, PT  9/8/2017

## 2017-09-08 NOTE — PLAN OF CARE
Problem: Patient Care Overview (Adult)  Goal: Plan of Care Review    09/08/17 1440   Coping/Psychosocial Response Interventions   Plan Of Care Reviewed With patient   Outcome Evaluation   Outcome Summary/Follow up Plan pt presents w. generalized weakness 2' med status, ETOH polyneuropathy, good tolerance to household distance ambulation rwx - some ataxia and balance impairements, fwd posturing, may benefit from skilled PT acutely to address functional deficits and assist with planning.          Problem: Inpatient Physical Therapy  Goal: Bed Mobility Goal LTG- PT    09/08/17 1440   Bed Mobility PT LTG   Bed Mobility PT LTG, Date Established 09/08/17   Bed Mobility PT LTG, Time to Achieve 1 wk   Bed Mobility PT LTG, Activity Type all bed mobility   Bed Mobility PT LTG, [place] Level supervision required       Goal: Transfer Training Goal 1 LTG- PT    09/08/17 1440   Transfer Training PT LTG   Transfer Training PT LTG, Date Established 09/08/17   Transfer Training PT LTG, Time to Achieve 1 wk   Transfer Training PT LTG, Activity Type all transfers   Transfer Training PT LTG, [place] Level contact guard assist   Transfer Training PT LTG, Assist Device walker, rolling       Goal: Gait Training Goal LTG- PT    09/08/17 1440   Gait Training PT LTG   Gait Training Goal PT LTG, Date Established 09/08/17   Gait Training Goal PT LTG, Time to Achieve 1 wk   Gait Training Goal PT LTG, [place] Level contact guard assist   Gait Training Goal PT LTG, Assist Device walker, rolling   Gait Training Goal PT LTG, Distance to Achieve 150

## 2017-09-08 NOTE — SIGNIFICANT NOTE
09/08/17 1454   Rehab Treatment   Discipline occupational therapist   Rehab Evaluation   Evaluation Not Performed (working with Speech Therapy.  Will follow up next service date)   Recommendation   OT - Next Appointment 09/09/17

## 2017-09-08 NOTE — THERAPY EVALUATION
Acute Care - Speech Language Pathology   Swallow Initial Evaluation [facility]     Patient Name: Neal Flores  : 1935  MRN: [id number]  Today's Date: 2017  Onset of Illness/Injury or Date of Surgery Date: 17            Admit Date: 2017    SPEECH-LANGUAGE PATHOLOGY EVALUATION - SWALLOW  Subjective: The patient was seen on this date for a Clinical Swallow evaluation.  Pt [place], confused, following directions..    The patient's history is significant for ETOH induced withdrawal. On regular diet, RN consulted SLP d/t patient coughing with solids. Pt wears dentures, dentures not present. CXR revealed chronic findings only. No speech hx. RN reports pt requiring small doses of ativan per CIWA protocol at this time.    Objective: Textures given included thin liquid, puree consistency and mechanical soft consistency.  Assessment: Difficulties were noted with thin liquid and mechanical soft consistency, characterized by oral holding vs swallow delay with thins via straw and increased mastication with with mech soft. Pt with consistent double swallow , voice clear following PO.   SLP Findings:  Patient presents with mild oropharyngeal dysphagia, without esophageal component.   Recommendations: Diet Textures: thin liquid, mechanical soft consistency with no mixed textures food.  Medications should be taken whole with puree. IF overt s/s of asp observed, make patient NPO and re consult SLP.   Recommended Strategies: Upright for PO and small bites and sips. Oral care before breakfast, after all meals and PRN.  Other Recommended Evaluations: Re-evaluation at bedside    Dysphagia therapy is recommended. Rationale: to monitor diet tolerance.    Visit Dx:     ICD-10-CM ICD-9-CM   1. LOLI (acute kidney injury) N17.9 584.9   2. 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 Dehydration E86.0 276.51   4. Alcohol abuse F10.10 305.00   5. Impaired gait and mobility R26.89 781.2     Patient Active Problem List    Diagnosis   • Benign essential hypertension   • Hypercholesterolemia   • Alcoholism   • PAF (paroxysmal atrial fibrillation)   • Renal stones   • H/O abdominal aortic aneurysm repair   • History of deep vein thrombophlebitis of lower extremity   • Chronic anxiety   • Chronic depression   • H/O traumatic subdural hematoma   • Acute appendicitis with appendiceal abscess   • Recurrent falls   • Multiple pulmonary nodules   • Syncope   • Fracture of multiple ribs   • Hypothyroidism   • Deep vein thrombosis   • Degeneration of intervertebral disc of lumbar region   • Alcohol-induced polyneuropathy   • [place]   • LOLI (acute kidney injury)   • Alcohol withdrawal     Past Medical History:   Diagnosis Date   • Alcohol abuse    • Anxiety    • Arthritis    • Depression    • Hyperlipidemia    • Hypertension    • Kidney stone      Past Surgical History:   Procedure Laterality Date   • LAMINECTOMY     • TONSILLECTOMY            SWALLOW EVALUATION (last 72 hours)      Swallow Evaluation       09/08/17 1505                Rehab Evaluation    Document Type evaluation  -        Subjective Information agree to therapy;no complaints  -        Symptoms Noted During/After Treatment none  -        General Information    Patient Profile Review yes  -SH        Subjective Patient Observations Pt [place], pleasant  -        Pertinent History Of Current Problem dysphagia with solids  -        Current Diet Limitations thin liquids;mechanical soft  -        Prior Level of Function- Swallowing no diet consistency restrictions;esophageal concerns  -        Plans/Goals Discussed With patient  -        Barriers to Rehab none identified;medically complex;previous functional deficit  -        Clinical Impression    Patient's Goals For Discharge patient did not state  -        SLP Swallowing Diagnosis mild dysphagia  -        Rehab Potential/Prognosis, Swallowing good, to achieve stated therapy goals  -        Criteria for Skilled  Therapeutic Interventions Met skilled criteria for dysphagia intervention met  -        FCM, Swallowing 5-->Level 5  -        Therapy Frequency PRN  -        Predicted Duration Therapy Interv (days) until discharge  -        SLP Diet Recommendation IV - mechanical soft, no mixed consistencies;thin liquids  -        Recommended Diagnostics reassess via clinical swallow (non-instrumental exam)  -        Recommended Feeding/Eating Techniques maintain upright posture during/after eating for 30 mins  -        SLP Rec. for Method of Medication Administration meds whole in pudding/applesauce  -        Monitor For Signs Of Aspiration cough;elevated WBC count;gurgly voice;throat clearing;fever;upper respiratory infection;pneumonia;right lower lobe infiltrates  -        Anticipated Discharge Disposition other (see comments)   pending POC  -        Pain Assessment    Pain Assessment No/denies pain  -        Oral Motor Structure and Function    Oral Motor Assessment Comment Poor dentition, no focal weakness, harsh voice  -          User Key  (r) = Recorded By, (t) = Taken By, (c) = Cosigned By    Initials Name Effective Dates     Bhargavi Virk MS [facility]-SLP 07/13/17 -         EDUCATION  The patient has been educated in the following areas:   Dysphagia (Swallowing Impairment) Modified Diet Instruction.    SLP Recommendation and Plan  SLP Swallowing Diagnosis: mild dysphagia  SLP Diet Recommendation: IV - mechanical soft, no mixed consistencies, thin liquids  Recommended Feeding/Eating Techniques: maintain upright posture during/after eating for 30 mins  SLP Rec. for Method of Medication Administration: meds whole in pudding/applesauce  Monitor For Signs Of Aspiration: cough, elevated WBC count, gurgly voice, throat clearing, fever, upper respiratory infection, pneumonia, right lower lobe infiltrates  Recommended Diagnostics: reassess via clinical swallow (non-instrumental exam)  Criteria for Skilled  Therapeutic Interventions Met: skilled criteria for dysphagia intervention met  Anticipated Discharge Disposition: other (see comments) (pending POC)  Rehab Potential/Prognosis, Swallowing: good, to achieve stated therapy goals  Therapy Frequency: PRN             Plan of Care Review  Plan Of Care Reviewed With: patient  Progress: declining  Outcome Summary/Follow up Plan: SLP recs downgrade to [facility] soft          IP SLP Goals       09/08/17 1508          Safely Consume Diet    Safely Consume Diet- [facility], Date Established 09/08/17  -      Safely Consume Diet- SLP, Time to Achieve by discharge  -        User Key  (r) = Recorded By, (t) = Taken By, (c) = Cosigned By    Initials Name Provider Type    FAYE Virk MS CCC-SLP Speech and Language Pathologist             SLP Outcome Measures (last 72 hours)      SLP Outcome Measures       09/08/17 1500          SLP Outcome Measures    Outcome Measure Used? Adult NOMS  -      FCM Scores    FCM Chosen Swallowing  -      Swallowing FCM Score 5  -        User Key  (r) = Recorded By, (t) = Taken By, (c) = Cosigned By    Initials Name Effective Dates     Bhargavi Virk MS CCC-SLP 07/13/17 -            Time Calculation:         Time Calculation- SLP       09/08/17 1510          Time Calculation- [facility]    SLP Start Time 1400  -      SLP Stop Time 1445  -      SLP Time Calculation (min) 45 min  -      SLP Received On 09/08/17  -        User Key  (r) = Recorded By, (t) = Taken By, (c) = Cosigned By    Initials Name Provider Type    FAYE Virk MS CCC-SLP Speech and Language Pathologist          Therapy Charges for Today     Code Description Service Date Service Provider Modifiers Qty    79041289957  ST EVAL ORAL PHARYNG SWALLOW 3 9/8/2017 Bhargavi Virk MS CCC-SLP GN 1               Bhargavi Virk MS CCC-CHERI  9/8/2017

## 2017-09-08 NOTE — PROGRESS NOTES
[facility]     LOS: 2 days     Subjective:    No n/v/d  Some evidence of etoh w/d given ativan x 7 days    Objective:    Vital Signs:  Temp:  [97.7 °F (36.5 °C)-99.6 °F (37.6 °C)] 98 °F (36.7 °C)  Heart Rate:  [60-70] 60  Resp:  [16-18] 18  BP: (133-141)/(56-78) 139/56    General Appearance: no acute distress, appears comfortable  Heart: regular rate and rhythm  Lungs: clear to auscultation bilaterally, respirations unlabored, good air entry  Abdomen: soft, non-tender, no guarding, no rebound, non-distended  Extremities: no edema, no cyanosis  Neurology: speech normal, CN grossly normal  Psychiatric: normal mood and affect    Results Review:    Glucose   Date Value Ref Range Status   09/08/2017 101 (H) 65 - 99 mg/dL Final   09/06/2017 113 (H) 65 - 99 mg/dL Final       Results from last 7 days  Lab Units 09/08/17  0556   WBC 10*3/mm3 7.55   HEMOGLOBIN g/dL 11.4*   HEMATOCRIT % 35.3*   PLATELETS 10*3/mm3 108*       Results from last 7 days  Lab Units 09/08/17  0556   SODIUM mmol/L 136   POTASSIUM mmol/L 3.4*   CHLORIDE mmol/L 98   CO2 mmol/L 27.7   BUN mg/dL 18   CREATININE mg/dL 1.28*   CALCIUM mg/dL 8.2*   BILIRUBIN mg/dL 1.0   ALK PHOS U/L 77   ALT (SGPT) U/L 41   AST (SGOT) U/L 42*   GLUCOSE mg/dL 101*       Results from last 7 days  Lab Units 09/08/17  0556 09/06/17  2018   INR  1.10 1.08   APTT seconds  --  26.7       Results from last 7 days  Lab Units 09/06/17  2018   MAGNESIUM mg/dL 2.0                             Urine Culture   Date Value Ref Range Status   09/06/2017 50,000 CFU/mL Mixed Culture  Final        I have reviewed daily medications and changes in CPOE    Scheduled meds    atorvastatin 10 mg Oral Daily   cadexomer iodine  Topical Daily   cholecalciferol 1,000 Units Oral Daily   escitalopram 10 mg Oral Nightly   famotidine 20 mg Oral Daily   heparin (porcine) 5,000 Units Subcutaneous Q12H   IV Fluids 1000 mL + additives 100 mL/hr Intravenous Q24H   potassium chloride  40 mEq Oral Once          PRN meds  LORazepam **OR** LORazepam **OR** LORazepam **OR** LORazepam **OR** LORazepam **OR** LORazepam **OR** LORazepam **OR** LORazepam  •  ondansetron **OR** ondansetron ODT **OR** ondansetron  •  ondansetron  •  sodium chloride      Principal Problem:    LOLI (acute kidney injury)  Active Problems:    Benign essential hypertension    Alcoholism    PAF (paroxysmal atrial fibrillation)    Chronic anxiety    Hypothyroidism    Alcohol-induced polyneuropathy    Alcohol withdrawal        Assessment/Plan:      LOLI (acute kidney injury)- much better, check PVR, urine is abnormal      Benign essential hypertension-bp is good      Alcoholism      PAF (paroxysmal atrial fibrillation)- currently in sinus per the nurse based on the rhythm strips, not an anticoagulation candidate      Chronic anxiety      Hypothyroidism- tsh is normal and don't see that he is on any supplement      Alcohol-induced polyneuropathy      Alcohol withdrawal- thiamine and ativan prn    Abnormal urine- recheck the urine    hypokalemia    Small adrenal nodule- monitor, will d/w patient about possible work up.      Plan:  Monitor for ETOH w/d, ambulate, replace K    Allen Sheikh MD  09/08/17  12:16 PM

## 2017-09-08 NOTE — PLAN OF CARE
Problem: Patient Care Overview (Adult)  Goal: Plan of Care Review  Outcome: Ongoing (interventions implemented as appropriate)    09/08/17 0512   Coping/Psychosocial Response Interventions   Plan Of Care Reviewed With patient   Patient Care Overview   Progress improving   Outcome Evaluation   Outcome Summary/Follow up Plan Restless at intervals. CIWA scores 6-8, Medicated with ativan with good results. Hiccups at intervals. No emesis. Remains calm,cooperative. Voids per urinal, also incontinent of urine.       Goal: Adult Individualization and Mutuality  Outcome: Ongoing (interventions implemented as appropriate)  Goal: Discharge Needs Assessment  Outcome: Ongoing (interventions implemented as appropriate)    Problem: Renal Failure/Kidney Injury, Acute (Adult)  Goal: Signs and Symptoms of Listed Potential Problems Will be Absent or Manageable (Renal Failure/Kidney Injury, Acute)  Outcome: Ongoing (interventions implemented as appropriate)    Problem: Fall Risk (Adult)  Goal: Identify Related Risk Factors and Signs and Symptoms  Outcome: Ongoing (interventions implemented as appropriate)  Goal: Absence of Falls  Outcome: Ongoing (interventions implemented as appropriate)    Problem: Pain, Acute (Adult)  Goal: Identify Related Risk Factors and Signs and Symptoms  Outcome: Outcome(s) achieved Date Met:  09/08/17  Goal: Acceptable Pain Control/Comfort Level  Outcome: Ongoing (interventions implemented as appropriate)    Problem: Acute Alcohol Withdrawal Syndrome, Risk For/Actual (Adult)  Goal: Signs and Symptoms of Listed Potential Problems Will be Absent or Manageable (Acute Alcohol Withdrawal Syndrome, Risk For/Actual)  Outcome: Ongoing (interventions implemented as appropriate)

## 2017-09-08 NOTE — PLAN OF CARE
Problem: Patient Care Overview (Adult)  Goal: Plan of Care Review    09/08/17 1508   Coping/Psychosocial Response Interventions   Plan Of Care Reviewed With patient   Patient Care Overview   Progress declining   Outcome Evaluation   Outcome Summary/Follow up Plan SLP recs downgrade to [facility] soft         Problem: Inpatient SLP  Goal: Dysphagia- Patient will safely consume diet as per recommendation with no signs/symptoms of aspiration    09/08/17 1508   Safely Consume Diet   Safely Consume Diet- SLP, Date Established 09/08/17   Safely Consume Diet- SLP, Time to Achieve by discharge

## 2017-09-08 NOTE — PLAN OF CARE
Problem: Patient Care Overview (Adult)  Goal: Plan of Care Review  Outcome: Ongoing (interventions implemented as appropriate)    09/08/17 1544   Coping/Psychosocial Response Interventions   Plan Of Care Reviewed With patient   Patient Care Overview   Progress no change   Outcome Evaluation   Outcome Summary/Follow up Plan Patient had PT and SLP came and see him today. Speech downgraded patient to [facility] soft diet. Patient still on CIWA protocol. Vital signs stable. Will continue to monitor.          Problem: Renal Failure/Kidney Injury, Acute (Adult)  Goal: Signs and Symptoms of Listed Potential Problems Will be Absent or Manageable (Renal Failure/Kidney Injury, Acute)  Outcome: Ongoing (interventions implemented as appropriate)    Problem: Fall Risk (Adult)  Goal: Identify Related Risk Factors and Signs and Symptoms  Outcome: Ongoing (interventions implemented as appropriate)  Goal: Absence of Falls  Outcome: Ongoing (interventions implemented as appropriate)    Problem: Pain, Acute (Adult)  Goal: Acceptable Pain Control/Comfort Level  Outcome: Ongoing (interventions implemented as appropriate)    Problem: Acute Alcohol Withdrawal Syndrome, Risk For/Actual (Adult)  Goal: Signs and Symptoms of Listed Potential Problems Will be Absent or Manageable (Acute Alcohol Withdrawal Syndrome, Risk For/Actual)  Outcome: Ongoing (interventions implemented as appropriate)

## 2017-09-09 LAB
ANION GAP SERPL CALCULATED.3IONS-SCNC: 11.2 MMOL/L
BASOPHILS # BLD AUTO: 0.03 10*3/MM3 (ref 0–0.2)
BASOPHILS NFR BLD AUTO: 0.5 % (ref 0–1.5)
BUN BLD-MCNC: 15 MG/DL (ref 8–23)
BUN/CREAT SERPL: 13.9 (ref 7–25)
CALCIUM SPEC-SCNC: 8.3 MG/DL (ref 8.6–10.5)
CHLORIDE SERPL-SCNC: 99 MMOL/L (ref 98–107)
CO2 SERPL-SCNC: 26.8 MMOL/L (ref 22–29)
CREAT BLD-MCNC: 1.08 MG/DL (ref 0.76–1.27)
DEPRECATED RDW RBC AUTO: 72.6 FL (ref 37–54)
EOSINOPHIL # BLD AUTO: 0.09 10*3/MM3 (ref 0–0.7)
EOSINOPHIL NFR BLD AUTO: 1.4 % (ref 0.3–6.2)
ERYTHROCYTE [DISTWIDTH] IN BLOOD BY AUTOMATED COUNT: 22.5 % (ref 11.5–14.5)
GFR SERPL CREATININE-BSD FRML MDRD: 65 ML/MIN/1.73
GLUCOSE BLD-MCNC: 107 MG/DL (ref 65–99)
HCT VFR BLD AUTO: 39.4 % (ref 40.4–52.2)
HGB BLD-MCNC: 12.7 G/DL (ref 13.7–17.6)
IMM GRANULOCYTES # BLD: 0.03 10*3/MM3 (ref 0–0.03)
IMM GRANULOCYTES NFR BLD: 0.5 % (ref 0–0.5)
LYMPHOCYTES # BLD AUTO: 1.2 10*3/MM3 (ref 0.9–4.8)
LYMPHOCYTES NFR BLD AUTO: 19.3 % (ref 19.6–45.3)
MCH RBC QN AUTO: 28.7 PG (ref 27–32.7)
MCHC RBC AUTO-ENTMCNC: 32.2 G/DL (ref 32.6–36.4)
MCV RBC AUTO: 89.1 FL (ref 79.8–96.2)
MONOCYTES # BLD AUTO: 0.6 10*3/MM3 (ref 0.2–1.2)
MONOCYTES NFR BLD AUTO: 9.7 % (ref 5–12)
NEUTROPHILS # BLD AUTO: 4.26 10*3/MM3 (ref 1.9–8.1)
NEUTROPHILS NFR BLD AUTO: 68.6 % (ref 42.7–76)
PLATELET # BLD AUTO: 155 10*3/MM3 (ref 140–500)
PMV BLD AUTO: 10.9 FL (ref 6–12)
POTASSIUM BLD-SCNC: 3.8 MMOL/L (ref 3.5–5.2)
RBC # BLD AUTO: 4.42 10*6/MM3 (ref 4.6–6)
SODIUM BLD-SCNC: 137 MMOL/L (ref 136–145)
WBC NRBC COR # BLD: 6.21 10*3/MM3 (ref 4.5–10.7)

## 2017-09-09 PROCEDURE — 25010000002 MAGNESIUM SULFATE PER 500 MG OF MAGNESIUM: Performed by: HOSPITALIST

## 2017-09-09 PROCEDURE — 97165 OT EVAL LOW COMPLEX 30 MIN: CPT

## 2017-09-09 PROCEDURE — 25010000002 HEPARIN (PORCINE) PER 1000 UNITS: Performed by: INTERNAL MEDICINE

## 2017-09-09 PROCEDURE — 97110 THERAPEUTIC EXERCISES: CPT

## 2017-09-09 PROCEDURE — 25010000002 THIAMINE PER 100 MG: Performed by: HOSPITALIST

## 2017-09-09 PROCEDURE — 80048 BASIC METABOLIC PNL TOTAL CA: CPT | Performed by: HOSPITALIST

## 2017-09-09 PROCEDURE — 85025 COMPLETE CBC W/AUTO DIFF WBC: CPT | Performed by: HOSPITALIST

## 2017-09-09 RX ADMIN — FAMOTIDINE 20 MG: 20 TABLET, FILM COATED ORAL at 08:54

## 2017-09-09 RX ADMIN — VITAMIN D, TAB 1000IU (100/BT) 1000 UNITS: 25 TAB at 08:54

## 2017-09-09 RX ADMIN — HEPARIN SODIUM 5000 UNITS: 5000 INJECTION, SOLUTION INTRAVENOUS; SUBCUTANEOUS at 08:54

## 2017-09-09 RX ADMIN — HEPARIN SODIUM 5000 UNITS: 5000 INJECTION, SOLUTION INTRAVENOUS; SUBCUTANEOUS at 20:29

## 2017-09-09 RX ADMIN — ESCITALOPRAM 10 MG: 10 TABLET, FILM COATED ORAL at 20:29

## 2017-09-09 RX ADMIN — FOLIC ACID 100 ML/HR: 5 INJECTION, SOLUTION INTRAMUSCULAR; INTRAVENOUS; SUBCUTANEOUS at 20:30

## 2017-09-09 RX ADMIN — LORAZEPAM 0.5 MG: 0.5 TABLET ORAL at 12:26

## 2017-09-09 RX ADMIN — ATORVASTATIN CALCIUM 10 MG: 10 TABLET, FILM COATED ORAL at 08:54

## 2017-09-09 RX ADMIN — Medication: at 08:54

## 2017-09-09 NOTE — PROGRESS NOTES
"Met w/ patient and reviewed chart.  Patient is pleasant.  He is smiling and engaging.  CIWA scores have been 8 or less for last 24 hrs. Patient denied any SI of HI.  He states that he is very week.  He states he has a \"lady friend\" going to move in w/ him.  He had mentioned her during the assessment.    "

## 2017-09-09 NOTE — THERAPY EVALUATION
Acute Care - Occupational Therapy Initial Evaluation  [facility]     Patient Name: Neal Flores  : 1935  MRN: [id number]  Today's Date: 2017  Onset of Illness/Injury or Date of Surgery Date: 17     Referring Physician: Kori    Admit Date: 2017       ICD-10-CM ICD-9-CM   1. LOLI (acute kidney injury) N17.9 584.9   2. 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 Dehydration E86.0 276.51   4. Alcohol abuse F10.10 305.00   5. Impaired gait and mobility R26.89 781.2     Patient Active Problem List   Diagnosis   • Benign essential hypertension   • Hypercholesterolemia   • Alcoholism   • PAF (paroxysmal atrial fibrillation)   • Renal stones   • H/O abdominal aortic aneurysm repair   • History of deep vein thrombophlebitis of lower extremity   • Chronic anxiety   • Chronic depression   • H/O traumatic subdural hematoma   • Acute appendicitis with appendiceal abscess   • Recurrent falls   • Multiple pulmonary nodules   • Syncope   • Fracture of multiple ribs   • Hypothyroidism   • Deep vein thrombosis   • Degeneration of intervertebral disc of lumbar region   • Alcohol-induced polyneuropathy   • Converse   • LOLI (acute kidney injury)   • Alcohol withdrawal     Past Medical History:   Diagnosis Date   • Alcohol abuse    • Anxiety    • Arthritis    • Depression    • Hyperlipidemia    • Hypertension    • Kidney stone      Past Surgical History:   Procedure Laterality Date   • LAMINECTOMY     • TONSILLECTOMY            OT ASSESSMENT FLOWSHEET (last 72 hours)      OT Evaluation       17 1100 17 1505 17 1454 17 1434 17 1610    Rehab Evaluation    Document Type evaluation  -MR evaluation  -SH  evaluation  -LH     Subjective Information agree to therapy;complains of;weakness  -MR agree to therapy;no complaints  -SH  agree to therapy  -LH     Evaluation Not Performed   --   working with Speech Therapy.  Will follow up next service date  -LE      Patient Effort, Rehab Treatment  good  -MR   good  -     Symptoms Noted During/After Treatment  none  -SH  none  -     General Information    Patient Profile Review yes  -MR   yes  -     Onset of Illness/Injury or Date of Surgery Date 09/06/17  -MR   09/06/17  -     Referring Physician    Kori  -     General Observations    seated in chair no acute distress, alarm in place  -     Pertinent History Of Current Problem ETOH polyneuropathy  -MR   ETOH polyneuropathy  -     Precautions/Limitations fall precautions  -MR   fall precautions  -     Prior Level of Function independent:  -MR   independent:  -     Equipment Currently Used at Home walker, rolling  -MR   walker, rolling  -LH walker, rolling  -PT    Plans/Goals Discussed With patient  -MR   patient  -LH     Risks Reviewed    patient:  -     Benefits Reviewed patient:  -MR   patient:  -     Living Environment    Lives With alone  -MR    alone  -PT    Living Arrangements house  -MR    house  -PT    Home Accessibility     stairs to enter home  -PT    Transportation Available     car;family or friend will provide  -PT    Clinical Impression    Impairments Found (describe specific impairments) aerobic capacity/endurance;gait, locomotion, and balance  -MR        Criteria for Skilled Therapeutic Interventions Met yes;treatment indicated  -MR        Rehab Potential good, to achieve stated therapy goals  -        Therapy Frequency 3-5 times/wk  -MR        Anticipated Discharge Disposition skilled nursing facility  -        Pain Assessment    Pain Assessment 0-10  -MR No/denies pain  -  No/denies pain  -     Pain Score 4  -MR        Pain Type Acute pain  -MR        Pain Location Hip  -MR        Pain Orientation Right  -MR        Vision Assessment/Intervention    Visual Impairment    WNL  -     Cognitive Assessment/Intervention    Current Cognitive/Communication Assessment functional  -   functional  -     Orientation Status oriented x 4  -MR   oriented x 4  -      Follows Commands/Answers Questions 100% of the time;needs cueing  -MR   100% of the time;needs cueing  -     Personal Safety    WNL/WFL  -     Personal Safety Interventions fall prevention program maintained  -MR        ROM (Range of Motion)    General ROM no range of motion deficits identified  -MR   no range of motion deficits identified  -     MMT (Manual Muscle Testing)    General MMT Assessment    no strength deficits identified  -     General MMT Assessment Detail BUE > or = 3+/5  -MR   BLEs grossly at least 3/5  -     Bed Mobility, Assessment/Treatment    Bed Mob, Supine to Sit, [place] minimum assist (75% patient effort);moderate assist (50% patient effort);verbal cues required  -MR   not tested  -     Bed Mob, Sit to Supine, [place] minimum assist (75% patient effort);moderate assist (50% patient effort);verbal cues required  -MR   minimum assist (75% patient effort)  -     Transfer Assessment/Treatment    Transfers, Sit-Stand [place] minimum assist (75% patient effort);moderate assist (50% patient effort);verbal cues required  -MR   moderate assist (50% patient effort);minimum assist (75% patient effort)  -     Transfers, Stand-Sit [place] minimum assist (75% patient effort);moderate assist (50% patient effort);verbal cues required  -MR   minimum assist (75% patient effort);moderate assist (50% patient effort);verbal cues required  -     Transfers, Sit-Stand-Sit, Assist Device rolling walker  -MR   rolling walker  -     Lower Body Dressing Assessment/Training    LB Dressing Assess/Train, Clothing Type donning:;slipper socks  -MR        LB Dressing Assess/Train, [place] maximum assist (25% patient effort)  -MR        Motor Skills/Interventions    Additional Documentation Balance Skills Training (Group)  -MR        Balance Skills Training    Standing-Level of Assistance Contact guard  -MR        Static Standing Balance Support Right upper extremity supported;Left  upper extremity supported  -        General Therapy Interventions    Planned Therapy Interventions activity intolerance;ADL retraining;strengthening;transfer training  -        Positioning and Restraints    Pre-Treatment Position in bed  -MR   sitting in chair/recliner  -     Post Treatment Position bed  -   bed  -     In Bed supine;notified ns;call light within reach;encouraged to call for assist;exit alarm on  -MR   supine;call light within reach;encouraged to call for assist;exit alarm on;notified nsg  -       09/07/17 1608 09/07/17 1400 09/06/17 2348 09/06/17 2342       General Information    Equipment Currently Used at Home    walker, rolling  -DA     Living Environment    Lives With  alone  -J alone  -DA      Living Arrangements  house  - house  -DA      Home Accessibility   stairs within home;stairs to enter home  -DA      Number of Stairs to Enter Home   2  -DA      Number of Stairs Within Home   --   doesn't use  -DA      Stair Railings at Home   outside, present on right side  -DA      Type of Financial/Environmental Concern   none  -DA      Transportation Available   car  -DA      Functional Level Prior    Ambulation 0-->independent  -PT   1-->assistive equipment   walker prn r/t back pain  -DA     Transferring 0-->independent  -PT   1-->assistive equipment  -DA     Toileting 0-->independent  -PT   0-->independent  -DA     Bathing 0-->independent  -PT   0-->independent  -DA     Dressing 0-->independent  -PT   0-->independent  -DA     Eating 0-->independent  -PT   0-->independent  -DA     Communication 0-->understands/communicates without difficulty  -PT   0-->understands/communicates without difficulty  -DA     Swallowing 0-->swallows foods/liquids without difficulty  -PT   0-->swallows foods/liquids without difficulty  -DA       User Key  (r) = Recorded By, (t) = Taken By, (c) = Cosigned By    Initials Name Effective Dates    GERARD Martin, OTR 04/13/15 -      Lubna Muhammad, PT 02/07/17  -     ALEX Guallpa, RN 06/16/16 -     PT Melissa Pretty RN 06/16/16 -     SH Bhargavi Virk, MS CCC-SLP 07/13/17 -     MR Mare Gonzales, OT 06/22/16 -     RUFINA Bernstein 01/27/16 -            Occupational Therapy Education     Title: PT OT SLP Therapies (Done)     Topic: Occupational Therapy (Done)     Point: ADL training (Done)    Description: Instruct learner(s) on proper safety adaptation and remediation techniques during self care or transfers.   Instruct in proper use of assistive devices.    Learning Progress Summary    Learner Readiness Method Response Comment Documented by Status   Patient Acceptance E VU Educated on role of OT in acute care setting. MR 09/09/17 1201 Done                      User Key     Initials Effective Dates Name Provider Type Discipline    MR 06/22/16 -  Mare Gonzales, OT Occupational Therapist OT                  OT Recommendation and Plan  Anticipated Discharge Disposition: skilled nursing facility  Planned Therapy Interventions: activity intolerance, ADL retraining, strengthening, transfer training  Therapy Frequency: 3-5 times/wk  Plan of Care Review  Plan Of Care Reviewed With: patient  Outcome Summary/Follow up Plan: Pt seen for initial evaluation, recommend skilled OT services to increase safety and independence with ADLs.          OT Goals       09/09/17 1202          Transfer Training OT LTG    Transfer Training OT LTG, Date Established 09/09/17  -MR      Transfer Training OT LTG, Time to Achieve 1 wk  -MR      Transfer Training OT LTG, Activity Type bed to chair /chair to bed;toilet  -MR      Transfer Training OT LTG, [place] Level contact guard assist  -MR      Transfer Training OT LTG, Assist Device walker, rolling  -MR      Strength OT LTG    Strength Goal OT LTG, Date Established 09/09/17  -MR      Strength Goal OT LTG, Time to Achieve 1 wk  -MR      Strength Goal OT LTG, Functional Goal Pt to perform BUE AROM x10 reps and 3 sets to increase strength  for ADLs.  -MR      ADL OT LTG    ADL OT LTG, Date Established 09/09/17  -MR      ADL OT LTG, Time to Achieve 1 wk  -MR      ADL OT LTG, Activity Type ADL skills  -MR      ADL OT LTG, [place] Level min assist;contact guard  -MR        User Key  (r) = Recorded By, (t) = Taken By, (c) = Cosigned By    Initials Name Provider Type    MR Mare Gonzales, OT Occupational Therapist                Outcome Measures       09/09/17 1200 09/08/17 1400       How much help from another person do you currently need...    Turning from your back to your side while in flat bed without using bedrails?  3  -LH     Moving from lying on back to sitting on the side of a flat bed without bedrails?  3  -LH     Moving to and from a bed to a chair (including a wheelchair)?  3  -LH     Standing up from a chair using your arms (e.g., wheelchair, bedside chair)?  2  -LH     Climbing 3-5 steps with a railing?  2  -LH     To walk in hospital room?  2  -LH     AM-PAC 6 Clicks Score  15  -LH     How much help from another is currently needed...    Putting on and taking off regular lower body clothing? 2  -MR      Bathing (including washing, rinsing, and drying) 2  -MR      Toileting (which includes using toilet bed pan or urinal) 2  -MR      Putting on and taking off regular upper body clothing 3  -MR      Taking care of personal grooming (such as brushing teeth) 3  -MR      Eating meals 4  -MR      Score 16  -MR      Functional Assessment    Outcome Measure Options AM-PAC 6 Clicks Daily Activity (OT)  - AM-PAC 6 Clicks Basic Mobility (PT)  -       User Key  (r) = Recorded By, (t) = Taken By, (c) = Cosigned By    Initials Name Provider Type     Lubna Muhammad, PT Physical Therapist    MR Mare Bennettzulma, OT Occupational Therapist          Time Calculation:   OT Start Time: 1057  OT Stop Time: 1113  OT Time Calculation (min): 16 min    Therapy Charges for Today     Code Description Service Date Service Provider Modifiers Qty     [id number]  OT EVAL LOW COMPLEXITY 2 9/9/2017 Mare Gonzales, OT GO 1               Mare Gonzales, OT  9/9/2017

## 2017-09-09 NOTE — PLAN OF CARE
Problem: Patient Care Overview (Adult)  Goal: Plan of Care Review  Outcome: Ongoing (interventions implemented as appropriate)    09/09/17 1829   Coping/Psychosocial Response Interventions   Plan Of Care Reviewed With patient   Patient Care Overview   Progress improving   Outcome Evaluation   Outcome Summary/Follow up Plan CIWA score 5. Pt asked for Ativan x 1 for nerves. Up in chair for meals. Amb with PT. Telemetry NSR. Pt states he is ready for discharge.         Problem: Renal Failure/Kidney Injury, Acute (Adult)  Goal: Signs and Symptoms of Listed Potential Problems Will be Absent or Manageable (Renal Failure/Kidney Injury, Acute)  Outcome: Ongoing (interventions implemented as appropriate)    09/09/17 1829   Renal Failure/Kidney Injury, Acute   Problems Assessed (Acute Renal Failure/Kidney Injury) all   Problems Present (Acute Renal Failure/Kidney Injury) situational response         Problem: Fall Risk (Adult)  Goal: Absence of Falls  Outcome: Ongoing (interventions implemented as appropriate)    Problem: Pain, Acute (Adult)  Goal: Acceptable Pain Control/Comfort Level  Outcome: Ongoing (interventions implemented as appropriate)    09/09/17 1829   Pain, Acute (Adult)   Acceptable Pain Control/Comfort Level making progress toward outcome         Problem: Acute Alcohol Withdrawal Syndrome, Risk For/Actual (Adult)  Goal: Signs and Symptoms of Listed Potential Problems Will be Absent or Manageable (Acute Alcohol Withdrawal Syndrome, Risk For/Actual)  Outcome: Ongoing (interventions implemented as appropriate)    09/09/17 1829   Acute Alcohol Withdrawal Syndrome, Risk For/Actual   Problems Assessed (Alcohol Withdrawal Syndrome) all   Problems Present (Alcohol Withdrawal Syndrome) situational response

## 2017-09-09 NOTE — PLAN OF CARE
Problem: Patient Care Overview (Adult)  Goal: Plan of Care Review  Outcome: Ongoing (interventions implemented as appropriate)    09/09/17 0448   Coping/Psychosocial Response Interventions   Plan Of Care Reviewed With patient   Patient Care Overview   Progress no change   Outcome Evaluation   Outcome Summary/Follow up Plan Patient continues CIWA protocol and is 4 on last assessment. Patient recewiving Banana bag at this times. Patient has not complained of any difficulty swallowing. Has has incontinent episodes this shift . No acute distress noted. Nursing will continue to monitor.        Goal: Adult Individualization and Mutuality  Outcome: Ongoing (interventions implemented as appropriate)  Goal: Discharge Needs Assessment  Outcome: Ongoing (interventions implemented as appropriate)    Problem: Renal Failure/Kidney Injury, Acute (Adult)  Goal: Signs and Symptoms of Listed Potential Problems Will be Absent or Manageable (Renal Failure/Kidney Injury, Acute)  Outcome: Ongoing (interventions implemented as appropriate)    Problem: Fall Risk (Adult)  Goal: Identify Related Risk Factors and Signs and Symptoms  Outcome: Outcome(s) achieved Date Met:  09/09/17  Goal: Absence of Falls  Outcome: Ongoing (interventions implemented as appropriate)    Problem: Pain, Acute (Adult)  Goal: Acceptable Pain Control/Comfort Level  Outcome: Ongoing (interventions implemented as appropriate)    Problem: Acute Alcohol Withdrawal Syndrome, Risk For/Actual (Adult)  Goal: Signs and Symptoms of Listed Potential Problems Will be Absent or Manageable (Acute Alcohol Withdrawal Syndrome, Risk For/Actual)  Outcome: Ongoing (interventions implemented as appropriate)

## 2017-09-09 NOTE — PLAN OF CARE
Problem: Patient Care Overview (Adult)  Goal: Plan of Care Review  Outcome: Ongoing (interventions implemented as appropriate)    09/09/17 1631   Coping/Psychosocial Response Interventions   Plan Of Care Reviewed With patient   Outcome Evaluation   Outcome Summary/Follow up Plan Pt w/ improved upright stability and ambulation endurance this date, but reports severe (B)LE fatigue. Seated HEP issued and reviewed, up to chair w/ katya

## 2017-09-09 NOTE — PLAN OF CARE
Problem: Patient Care Overview (Adult)  Goal: Plan of Care Review    09/09/17 1202   Coping/Psychosocial Response Interventions   Plan Of Care Reviewed With patient   Outcome Evaluation   Outcome Summary/Follow up Plan Pt seen for initial evaluation, recommend skilled OT services to increase safety and independence with ADLs.         Problem: Inpatient Occupational Therapy  Goal: Transfer Training Goal 1 LTG- OT    09/09/17 1202   Transfer Training OT LTG   Transfer Training OT LTG, Date Established 09/09/17   Transfer Training OT LTG, Time to Achieve 1 wk   Transfer Training OT LTG, Activity Type bed to chair /chair to bed;toilet   Transfer Training OT LTG, [place] Level contact guard assist   Transfer Training OT LTG, Assist Device walker, rolling       Goal: Strength Goal LTG- OT    09/09/17 1202   Strength OT LTG   Strength Goal OT LTG, Date Established 09/09/17   Strength Goal OT LTG, Time to Achieve 1 wk   Strength Goal OT LTG, Functional Goal Pt to perform BUE AROM x10 reps and 3 sets to increase strength for ADLs.       Goal: ADL Goal LTG- OT    09/09/17 1202   ADL OT LTG   ADL OT LTG, Date Established 09/09/17   ADL OT LTG, Time to Achieve 1 wk   ADL OT LTG, Activity Type ADL skills   ADL OT LTG, [place] Level min assist;contact guard

## 2017-09-09 NOTE — PROGRESS NOTES
[facility]     LOS: 3 days     Subjective:  trouble eating because the throat is so raw  Slight nausea  ativan x 3 in the last 24  Right hip pain   Needing help to get up    Objective:    Vital Signs:  Temp:  [97.6 °F (36.4 °C)-98.5 °F (36.9 °C)] 98.5 °F (36.9 °C)  Heart Rate:  [60-71] 69  Resp:  [14-18] 14  BP: (136-166)/(68-98) 166/98    General Appearance: no acute distress, appears comfortable  Heart: regular rate and rhythm  Lungs: clear to auscultation bilaterally, respirations unlabored, good air entry  Abdomen: soft, non-tender, no guarding, no rebound, non-distended  Extremities: no edema, no cyanosis  Neurology: speech normal, CN grossly normal  Psychiatric: normal mood and affect    Results Review:    Glucose   Date Value Ref Range Status   09/08/2017 101 (H) 65 - 99 mg/dL Final   09/06/2017 113 (H) 65 - 99 mg/dL Final       Results from last 7 days  Lab Units 09/08/17  0556   WBC 10*3/mm3 7.55   HEMOGLOBIN g/dL 11.4*   HEMATOCRIT % 35.3*   PLATELETS 10*3/mm3 108*       Results from last 7 days  Lab Units 09/08/17  0556   SODIUM mmol/L 136   POTASSIUM mmol/L 3.4*   CHLORIDE mmol/L 98   CO2 mmol/L 27.7   BUN mg/dL 18   CREATININE mg/dL 1.28*   CALCIUM mg/dL 8.2*   BILIRUBIN mg/dL 1.0   ALK PHOS U/L 77   ALT (SGPT) U/L 41   AST (SGOT) U/L 42*   GLUCOSE mg/dL 101*       Results from last 7 days  Lab Units 09/08/17  0556 09/06/17 2018   INR  1.10 1.08   APTT seconds  --  26.7       Results from last 7 days  Lab Units 09/06/17 2018   MAGNESIUM mg/dL 2.0                             Urine Culture   Date Value Ref Range Status   09/06/2017 50,000 CFU/mL Mixed Culture  Final        I have reviewed daily medications and changes in CPOE    Scheduled meds    atorvastatin 10 mg Oral Daily   cadexomer iodine  Topical Daily   cholecalciferol 1,000 Units Oral Daily   escitalopram 10 mg Oral Nightly   famotidine 20 mg Oral Daily   heparin (porcine) 5,000 Units Subcutaneous Q12H   IV Fluids  1000 mL + additives 100 mL/hr Intravenous Q24H          PRN meds  LORazepam **OR** LORazepam **OR** LORazepam **OR** LORazepam **OR** LORazepam **OR** LORazepam **OR** LORazepam **OR** LORazepam  •  ondansetron **OR** ondansetron ODT **OR** ondansetron  •  ondansetron  •  sodium chloride      Principal Problem:    LOLI (acute kidney injury)  Active Problems:    Benign essential hypertension    Alcoholism    PAF (paroxysmal atrial fibrillation)    Chronic anxiety    Hypothyroidism    Alcohol-induced polyneuropathy    Alcohol withdrawal        Assessment/Plan:      LOLI (acute kidney injury)- much better, check PVR, urine is abnormal      Benign essential hypertension-bp is good      Alcoholism      PAF (paroxysmal atrial fibrillation)- currently in sinus per the nurse based on the rhythm strips, not an anticoagulation candidate      Chronic anxiety      Hypothyroidism- tsh is normal and don't see that he is on any supplement      Alcohol-induced polyneuropathy      Alcohol withdrawal- thiamine and ativan prn    Abnormal urine- recheck the urine which is better, mild burning with urination but repeat urine is now normal    Hypokalemia- monitor    Small adrenal nodule- monitor, will d/w patient about possible work up.    thrombocytopenia-monitor    Plan:  Monitor for ETOH w/d, ambulate, replace K    Allen Sheikh MD  09/09/17  12:47 PM

## 2017-09-09 NOTE — THERAPY TREATMENT NOTE
Acute Care - Physical Therapy Treatment Note  [facility]     Patient Name: Neal Flores  : 1935  MRN: [id number]  Today's Date: 2017  Onset of Illness/Injury or Date of Surgery Date: 17  Date of Referral to PT: 17  Referring Physician: Kori    Admit Date: 2017    Visit Dx:    ICD-10-CM ICD-9-CM   1. LOLI (acute kidney injury) N17.9 584.9   2. 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 Dehydration E86.0 276.51   4. Alcohol abuse F10.10 305.00   5. Impaired gait and mobility R26.89 781.2     Patient Active Problem List   Diagnosis   • Benign essential hypertension   • Hypercholesterolemia   • Alcoholism   • PAF (paroxysmal atrial fibrillation)   • Renal stones   • H/O abdominal aortic aneurysm repair   • History of deep vein thrombophlebitis of lower extremity   • Chronic anxiety   • Chronic depression   • H/O traumatic subdural hematoma   • Acute appendicitis with appendiceal abscess   • Recurrent falls   • Multiple pulmonary nodules   • Syncope   • Fracture of multiple ribs   • Hypothyroidism   • Deep vein thrombosis   • Degeneration of intervertebral disc of lumbar region   • Alcohol-induced polyneuropathy   • [place]   • LOLI (acute kidney injury)   • Alcohol withdrawal               Adult Rehabilitation Note       17 1558          Rehab Assessment/Intervention    Discipline physical therapy assistant  -VANIA      Document Type therapy note (daily note)  -VANIA      Subjective Information agree to therapy  -VANIA      Patient Effort, Rehab Treatment good  -VANIA      Precautions/Limitations fall precautions  -VANIA      Recorded by [VANIA] Trevon Amado PTA      Pain Assessment    Pain Assessment 0-10  -VANIA      Pain Score 6  -VANIA      Post Pain Score 6  -VANIA      Pain Type Acute pain  -VANIA      Pain Location Hip  -VANIA      Pain Orientation Right  -VANIA      Pain Intervention(s) Ambulation/increased activity;Repositioned;Rest  -VANIA      Recorded by [VANIA] Trevon Amado PTA      Cognitive  Assessment/Intervention    Current Cognitive/Communication Assessment functional  -VANIA      Orientation Status oriented x 4  -VANIA      Follows Commands/Answers Questions 100% of the time  -VNAIA      Personal Safety mild impairment  -VANIA      Personal Safety Interventions fall prevention program maintained;gait belt;nonskid shoes/slippers when out of bed  -VANIA      Recorded by [VANIA] Trevon Amado PTA      Bed Mobility, Assessment/Treatment    Bed Mobility, Assistive Device bed rails;head of bed elevated  -VANIA      Bed Mob, Supine to Sit, [place] minimum assist (75% patient effort);verbal cues required  -VANIA      Bed Mob, Sit to Supine, [place] not tested  -VANIA      Bed Mobility, Safety Issues decreased use of arms for pushing/pulling;decreased use of legs for bridging/pushing  -VANIA      Bed Mobility, Impairments strength decreased;pain  -VANIA      Recorded by [VANIA] Trevon Amado PTA      Transfer Assessment/Treatment    Transfers, Sit-Stand [place] contact guard assist;verbal cues required  -VANIA      Transfers, Stand-Sit [place] contact guard assist;verbal cues required  -VANIA      Transfers, Sit-Stand-Sit, Assist Device rolling walker  -VANIA      Transfer, Impairments strength decreased;impaired balance;pain  -VANIA      Recorded by [VANIA] Trevon Amado PTA      Gait Assessment/Treatment    Gait, [place] Level minimum assist (75% patient effort);verbal cues required  -VANIA      Gait, Assistive Device rolling walker  -VANIA      Gait, Distance (Feet) 100  -VANIA      Gait, Gait Deviations virginie decreased;decreased heel strike;double stance time increased;forward flexed posture;limb motion velocity decreased;step length decreased;toe-to-floor clearance decreased;weight-shifting ability decreased  -VANIA      Gait, Safety Issues step length decreased;weight-shifting ability decreased;balance decreased during turns  -VANIA      Gait, Impairments strength decreased;impaired balance;coordination impaired;pain  -VANIA      Recorded by [VANIA]  Trevon Amado PTA      Therapy Exercises    Bilateral Lower Extremities AROM:;10 reps;sitting;ankle pumps/circles;LAQ;hip flexion   hip add w/ pillow  -VANIA      Recorded by [VANIA] Trevon Amado PTA      Positioning and Restraints    Pre-Treatment Position in bed  -VANIA      Post Treatment Position chair  -VANIA      In Chair reclined;call light within reach;encouraged to call for assist;exit alarm on  -VANIA      Recorded by [VANIA] Trevon Amado PTA        User Key  (r) = Recorded By, (t) = Taken By, (c) = Cosigned By    Initials Name Effective Dates    VANIA Amado PTA 04/24/15 -                 IP PT Goals       09/08/17 1440          Bed Mobility PT LTG    Bed Mobility PT LTG, Date Established 09/08/17  -LH      Bed Mobility PT LTG, Time to Achieve 1 wk  -LH      Bed Mobility PT LTG, Activity Type all bed mobility  -      Bed Mobility PT LTG, [place] Level supervision required  -      Transfer Training PT LTG    Transfer Training PT LTG, Date Established 09/08/17  -      Transfer Training PT LTG, Time to Achieve 1 wk  -      Transfer Training PT LTG, Activity Type all transfers  -      Transfer Training PT LTG, [place] Level contact guard assist  -      Transfer Training PT LTG, Assist Device walker, rolling  -      Gait Training PT LTG    Gait Training Goal PT LTG, Date Established 09/08/17  -      Gait Training Goal PT LTG, Time to Achieve 1 wk  -LH      Gait Training Goal PT LTG, [place] Level contact guard assist  -      Gait Training Goal PT LTG, Assist Device walker, rolling  -      Gait Training Goal PT LTG, Distance to Achieve 150  -LH        User Key  (r) = Recorded By, (t) = Taken By, (c) = Cosigned By    Initials Name Provider Type     Lubna Muhammad PT Physical Therapist          Physical Therapy Education     Title: PT OT SLP Therapies (Done)     Topic: Physical Therapy (Done)     Point: Mobility training (Done)    Learning Progress Summary    Learner Readiness Method Response  Comment Documented by Status   Patient Acceptance E VU,NR  VANIA 09/09/17 1631 Done    Acceptance E VU,NR   09/08/17 1439 Done               Point: Home exercise program (Done)    Learning Progress Summary    Learner Readiness Method Response Comment Documented by Status   Patient Acceptance E VU,NR  VANIA 09/09/17 1631 Done    Acceptance E VU,NR   09/08/17 1439 Done               Point: Body mechanics (Done)    Learning Progress Summary    Learner Readiness Method Response Comment Documented by Status   Patient Acceptance E VU,NR  VANIA 09/09/17 1631 Done    Acceptance E VU,NR   09/08/17 1439 Done               Point: Precautions (Done)    Learning Progress Summary    Learner Readiness Method Response Comment Documented by Status   Patient Acceptance E VU,NR  VANIA 09/09/17 1631 Done    Acceptance E VU,NR   09/08/17 1439 Done                      User Key     Initials Effective Dates Name Provider Type Discipline     02/07/17 -  Lubna Muhammad, PT Physical Therapist PT     04/24/15 -  Trevon Amado, PTA Physical Therapy Assistant PT                    PT Recommendation and Plan  Anticipated Discharge Disposition: skilled nursing facility  Planned Therapy Interventions: balance training, bed mobility training, gait training, home exercise program, ROM (Range of Motion), strengthening, stretching, transfer training  PT Frequency: daily  Plan of Care Review  Plan Of Care Reviewed With: patient  Outcome Summary/Follow up Plan: Pt w/ improved upright stability and ambulation endurance this date, but reports severe (B)LE fatigue.  Seated HEP issued and reviewed, up to chair w/ alar.m.           Outcome Measures       09/09/17 1600 09/09/17 1200 09/08/17 1400    How much help from another person do you currently need...    Turning from your back to your side while in flat bed without using bedrails? 3  -VANIA  3  -LH    Moving from lying on back to sitting on the side of a flat bed without bedrails? 3  -VANIA  3  -LH    Moving to  and from a bed to a chair (including a wheelchair)? 3  -VANIA  3  -LH    Standing up from a chair using your arms (e.g., wheelchair, bedside chair)? 3  -VNAIA  2  -LH    Climbing 3-5 steps with a railing? 2  -VANIA  2  -LH    To walk in hospital room? 3  -VANIA  2  -LH    AM-PAC 6 Clicks Score 17  -VANIA  15  -LH    How much help from another is currently needed...    Putting on and taking off regular lower body clothing?  2  -MR     Bathing (including washing, rinsing, and drying)  2  -MR     Toileting (which includes using toilet bed pan or urinal)  2  -MR     Putting on and taking off regular upper body clothing  3  -MR     Taking care of personal grooming (such as brushing teeth)  3  -MR     Eating meals  4  -MR     Score  16  -MR     Functional Assessment    Outcome Measure Options AM-PAC 6 Clicks Basic Mobility (PT)  -VANIA AM-PAC 6 Clicks Daily Activity (OT)  -MR AM-PAC 6 Clicks Basic Mobility (PT)  -      User Key  (r) = Recorded By, (t) = Taken By, (c) = Cosigned By    Initials Name Provider Type     Lubna Muhammad, PT Physical Therapist    MR Mare Gonzales, OT Occupational Therapist    VANIA Amado PTA Physical Therapy Assistant           Time Calculation:         PT Charges       09/09/17 1631          Time Calculation    Start Time 1558  -VANIA      Stop Time 1612  -VANIA      Time Calculation (min) 14 min  -VANIA      PT Received On 09/09/17  -VANIA      PT - Next Appointment 09/10/17  -VANIA        User Key  (r) = Recorded By, (t) = Taken By, (c) = Cosigned By    Initials Name Provider Type    VANIA Amado PTA Physical Therapy Assistant          Therapy Charges for Today     Code Description Service Date Service Provider Modifiers Qty    24019368099 HC PT THER PROC EA 15 MIN 9/9/2017 Trevon Amado PTA GP 1          PT G-Codes  Outcome Measure Options: AM-PAC 6 Clicks Basic Mobility (PT)    Trevon Amado PTA  9/9/2017

## 2017-09-10 VITALS
HEART RATE: 62 BPM | HEIGHT: 74 IN | OXYGEN SATURATION: 95 % | RESPIRATION RATE: 16 BRPM | BODY MASS INDEX: 23.27 KG/M2 | DIASTOLIC BLOOD PRESSURE: 78 MMHG | WEIGHT: 181.3 LBS | SYSTOLIC BLOOD PRESSURE: 171 MMHG | TEMPERATURE: 97.5 F

## 2017-09-10 PROCEDURE — 25010000002 HEPARIN (PORCINE) PER 1000 UNITS: Performed by: INTERNAL MEDICINE

## 2017-09-10 RX ORDER — LISINOPRIL 40 MG/1
20 TABLET ORAL DAILY
Start: 2017-09-10 | End: 2017-10-19

## 2017-09-10 RX ADMIN — HEPARIN SODIUM 5000 UNITS: 5000 INJECTION, SOLUTION INTRAVENOUS; SUBCUTANEOUS at 08:58

## 2017-09-10 RX ADMIN — ATORVASTATIN CALCIUM 10 MG: 10 TABLET, FILM COATED ORAL at 08:58

## 2017-09-10 RX ADMIN — VITAMIN D, TAB 1000IU (100/BT) 1000 UNITS: 25 TAB at 08:58

## 2017-09-10 RX ADMIN — FAMOTIDINE 20 MG: 20 TABLET, FILM COATED ORAL at 08:58

## 2017-09-10 RX ADMIN — Medication: at 08:59

## 2017-09-10 NOTE — PROGRESS NOTES
Met w/ patient and Jory who will be staying w/ patient.  Patient not sure how he is going to refrain from consuming ETOH.  Patient was provided list of [facility] tx options in [facility].  He states he has been to multiple AA meetings and programs and is not at this time open to treatment.  Pt. Is being discharged.

## 2017-09-10 NOTE — DISCHARGE SUMMARY
[facility]    ASSOCIATES  [phone]    DISCHARGE SUMMARY  [facility]    Patient Identification:  Name: Neal Flores  Age: 82 y.o.  Sex: male  :  1935  MRN: [id number]  Primary Care Physician: Praveen Cruz Jr., MD    Admit date: 2017  Discharge date and time: No discharge date for patient encounter.     Discharge Diagnoses:Principal Problem:    LOLI (acute kidney injury)  Active Problems:    Benign essential hypertension    Alcoholism    PAF (paroxysmal atrial fibrillation)    Chronic anxiety    Hypothyroidism    Alcohol-induced polyneuropathy    Alcohol withdrawal       History of present illness from H&P:    Source of information patient himself which is limited because of the fluctuating story as well as emergency room records.  Patient is a 82-year-old male who apparently lives by himself and admits to being an alcoholic claims that he has been on binge drinking for the last 3 weeks.  He had 1/5 of vodka last night and apparently fell and could not get up.  His son in law came to his house and found him in the situation and called EMS.     Patient is complaining of his nerves being shot and doesn't understand to get up and he falls down.  He says that he was laying around for almost 8 hours before ambulance was called.  He complains of pain in his both feet and legs.  He denies any fever or denies any chills denies any nausea or vomiting or abdominal pain.  He says that he is falling a lot lately.     Hospital Course:     LOLI (acute kidney injury)- much better, pvr 0 on , urine is abnormal but the repeat was normal.       Benign essential hypertension-bp is good       Alcoholism and possible withdraw, very little symptoms today, he is a little shaky on the feet. He is able to get up and walk with a walker       PAF (paroxysmal atrial fibrillation)- currently in sinus per the nurse based on the rhythm strips, not an anticoagulation candidate secondary to  etoh and being found down on admission.       Chronic anxiety       h/o Hypothyroidism- tsh is normal and don't see that he is on any supplement       Alcohol-induced polyneuropathy       Alcohol withdrawal- thiamine and ativan prn     Abnormal urine- recheck the urine which is better, mild burning with urination but repeat urine is now normal and the patient says no longer having dysuria     Hypokalemia- replaced and normal     Small adrenal nodule- monitor, will d/w patient about possible work up with endocrinologist or with PMD outpatient. Electrolytes ok here but as mentioned with the low potassium. BP is a little elevated and needs monitoring.    Hypertension- f/u pmd in one week and consider restarting bp meds.       thrombocytopenia-better monitor      Consults:     Consults     Date and Time Order Name Status Description    9/6/2017 2221 A (on-call MD unless specified) Completed             Results from last 7 days  Lab Units 09/09/17  1326   WBC 10*3/mm3 6.21   HEMOGLOBIN g/dL 12.7*   HEMATOCRIT % 39.4*   PLATELETS 10*3/mm3 155         Results from last 7 days  Lab Units 09/09/17  1416   SODIUM mmol/L 137   POTASSIUM mmol/L 3.8   CHLORIDE mmol/L 99   CO2 mmol/L 26.8   BUN mg/dL 15   CREATININE mg/dL 1.08   GLUCOSE mg/dL 107*   CALCIUM mg/dL 8.3*       Significant Diagnostic Studies:   Lab Results   Component Value Date    WBC 6.21 09/09/2017    HGB 12.7 (L) 09/09/2017    HCT 39.4 (L) 09/09/2017     09/09/2017     Lab Results   Component Value Date     09/09/2017    K 3.8 09/09/2017    CL 99 09/09/2017    CO2 26.8 09/09/2017    BUN 15 09/09/2017    CREATININE 1.08 09/09/2017    GLUCOSE 107 (H) 09/09/2017     Lab Results   Component Value Date    CALCIUM 8.3 (L) 09/09/2017     Lab Results   Component Value Date    AST 42 (H) 09/08/2017    ALT 41 09/08/2017    ALKPHOS 77 09/08/2017     Lab Results   Component Value Date    INR 1.10 09/08/2017     Lab Results   Component Value Date    COLORU Dark  Yellow (A) 09/08/2017    CLARITYU Clear 09/08/2017    PHUR 5.5 09/08/2017    GLUCOSEU Negative 09/08/2017    KETONESU Trace (A) 09/08/2017    BLOODU Negative 09/08/2017    LEUKOCYTESUR Negative 09/08/2017    BILIRUBINUR Negative 09/08/2017    UROBILINOGEN 1.0 E.U./dL 09/08/2017     No results found for: TROPONINT, TROPONINI, BNP  No components found for: HGBA1C;2    Imaging Results (all)     Procedure Component Value Units Date/Time    XR Chest 2 View [[id number]] Collected:  09/06/17 2122     Updated:  09/06/17 2126    Narrative:       TWO-VIEW CHEST X-RAY     CLINICAL HISTORY: weakness     COMPARISON: None.     FINDINGS: PA and lateral views of the chest were obtained. Lungs are  fairly well aerated. The right diaphragm is elevated. There are  pleural-based calcifications along the diaphragmatic region bilaterally  which may be related to prior asbestos exposure or be post infectious in  nature. There is no active air space disease process demonstrated. There  is evidence of prior granulomatous disease. Mild cardiomegaly noted.  Vascularity is normal. There is mild thoracic aortic ectasia. There are  no pleural effusions.  There are numerous old appearing bilateral rib  fractures           Impression:       1. Chronic findings as above, no active airspace disease appreciated.     This report was finalized on 9/6/2017 9:23 PM by Babar Jhaveri MD.       CT Head Without Contrast [[id number]] Collected:  09/06/17 2200     Updated:  09/06/17 2206    Narrative:       CRANIAL CT SCAN WITHOUT CONTRAST     CLINICAL HISTORY: possible fall  confusion     COMPARISON: None.     FINDINGS: Multiple axial images of the head were obtained without  contrast. There are no abnormal areas of increased density or mass  effect. There is generalized atrophy.  Ventricles, sulci, and cisterns  appear normal. Bone window images show very mild and chronic appearing  paranasal sinus disease. There is an old piter hole defect on the  left  side.       Impression:       1. No acute intracranial abnormality.                     This study was performed with techniques to keep radiation doses as low  as reasonably achievable (ALARA). Individualized dose reduction  techniques using automated exposure control or adjustment of mA and/or  kV according to the patient size were employed.      This report was finalized on 9/6/2017 10:02 PM by Babar Jhaveri MD.       CT Abdomen Pelvis Without Contrast [[id number]] Collected:  09/06/17 2203     Updated:  09/06/17 2210    Narrative:       NONCONTRAST CT SCANS ABDOMEN AND PELVIS     HISTORY: pain and vomiting     COMPARISON: None.     TECHNIQUE: Axial images were obtained from the lung bases to the  symphysis pubis without oral or IV contrast per request.      FINDINGS ABDOMEN CT:  There are pleural-based calcifications in the lung  bases which may be related to prior abscess this exposure or prior  infection. There are old appearing rib deformities. There is massive  fatty infiltration of the liver. Cholelithiasis is noted without  gallbladder wall thickening or pericholecystic inflammation. Bilateral  renal calculi appear to be primarily vascular. There is a low density  lesion involving the upper pole of the left kidney measuring 16 mm, a  small cyst is favored. There is cortical renal atrophy. There are  low-density adrenal nodules. The larger is on the right side and  measures 3.2 cm. It averages 17 Hounsfield units which is suggestive of  an adenoma. Follow-up recommended as there are no prior studies for  comparison. The remaining solid organs have an otherwise unremarkable  appearance without the benefit of intravenous contrast.     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 The native aorta only measures 3 cm diameter  on the exam which is only borderline aneurysmal. The lumen cannot be  evaluated without contrast. Normal appendix. There are  scattered colonic  diverticuli present. The GI tract was not opacified for assessment but  is non obstructive in appearance. No free fluid is demonstrated. There  are calcifications in a nonenlarged prostate gland.       Impression:       1.  Extensive fatty liver.  2. Small left renal lesion, favor cyst but incompletely characterized  without contrast. Recommend follow-up.  3. Uncomplicated cholelithiasis.  4. Small adrenal nodules as discussed. Follow-up recommended.  5. Colonic diverticulosis              This study was performed with techniques to keep radiation doses as low  as reasonably achievable (ALARA). Individualized dose reduction  techniques using automated exposure control or adjustment of mA and/or  kV according to the patient size were employed.      This report was finalized on 9/6/2017 10:07 PM by Babar Jhaveri MD.             TEST  RESULTS PENDING AT DISCHARGE      Patient Instructions:    Neal Flores   Home Medication Instructions AMOR:[id number]    Printed on:09/10/17 1027   Medication Information                      Cholecalciferol (VITAMIN D) 1000 UNITS tablet  Take 1,000 Units by mouth Daily.             escitalopram (LEXAPRO) 20 MG tablet  TAKE ONE TABLET BY MOUTH ONCE DAILY             famotidine (PEPCID) 20 MG tablet  Take 20 mg by mouth Daily.             lisinopril (PRINIVIL,ZESTRIL) 40 MG tablet  Take 0.5 tablets by mouth Daily.             simvastatin (ZOCOR) 10 MG tablet  Take 1 tablet by mouth Every Night.               No future appointments.  Follow-up Information     Follow up with [facility] .    Specialty:  Home Health Services    Contact information:    [address]  [facility] [address] [phone]        Discharge Order     Start     Ordered    09/10/17 1012  Discharge patient  Once     Expected Discharge Date:  09/10/17    Discharge Disposition:  Home or Self Care        09/10/17 1011        Follow with Dr Best 3-4 fr the adrenal mass or with  pmd.    Please check bp everyday at home    F/u pmd 1-2 weeks    Cbc and cmp in 1-2 weeks      Total time spent discharging patient including evaluation, post hospitalization follow up,  medication and post hospitalization instructions and education, total time exceeds 30 minutes.    Signed:  Allen Sheikh MD  9/10/2017  10:13 AM

## 2017-09-10 NOTE — DISCHARGE INSTRUCTIONS
Labwork:  CBC and CMP in 1-2 weeks.  Check blood pressure daily at home and record for the medical doctors.

## 2017-09-10 NOTE — PLAN OF CARE
Problem: Patient Care Overview (Adult)  Goal: Plan of Care Review  Outcome: Outcome(s) achieved Date Met:  09/10/17    09/10/17 1114   Coping/Psychosocial Response Interventions   Plan Of Care Reviewed With patient;significant other   Patient Care Overview   Progress improving   Outcome Evaluation   Outcome Summary/Follow up Plan Denies c/o's. CIWA score 2. Ate all of breakfast. Telemetry NSR. Pt states is ready for discharge.       Goal: Adult Individualization and Mutuality  Outcome: Outcome(s) achieved Date Met:  09/10/17  Goal: Discharge Needs Assessment  Outcome: Outcome(s) achieved Date Met:  09/10/17    Problem: Renal Failure/Kidney Injury, Acute (Adult)  Goal: Signs and Symptoms of Listed Potential Problems Will be Absent or Manageable (Renal Failure/Kidney Injury, Acute)  Outcome: Outcome(s) achieved Date Met:  09/10/17    09/10/17 1114   Renal Failure/Kidney Injury, Acute   Problems Assessed (Acute Renal Failure/Kidney Injury) all   Problems Present (Acute Renal Failure/Kidney Injury) none         Problem: Fall Risk (Adult)  Goal: Absence of Falls  Outcome: Outcome(s) achieved Date Met:  09/10/17    09/10/17 1114   Fall Risk (Adult)   Absence of Falls achieves outcome         Problem: Pain, Acute (Adult)  Goal: Acceptable Pain Control/Comfort Level  Outcome: Outcome(s) achieved Date Met:  09/10/17    09/10/17 1114   Pain, Acute (Adult)   Acceptable Pain Control/Comfort Level achieves outcome         Problem: Acute Alcohol Withdrawal Syndrome, Risk For/Actual (Adult)  Goal: Signs and Symptoms of Listed Potential Problems Will be Absent or Manageable (Acute Alcohol Withdrawal Syndrome, Risk For/Actual)  Outcome: Outcome(s) achieved Date Met:  09/10/17

## 2017-09-10 NOTE — PLAN OF CARE
Problem: Patient Care Overview (Adult)  Goal: Plan of Care Review  Outcome: Ongoing (interventions implemented as appropriate)    09/10/17 0520   Coping/Psychosocial Response Interventions   Plan Of Care Reviewed With patient   Patient Care Overview   Progress improving   Outcome Evaluation   Outcome Summary/Follow up Plan Patient doing much better, So far CIWA at 4. No complaint of pain. For discharge this morning.       Goal: Adult Individualization and Mutuality  Outcome: Ongoing (interventions implemented as appropriate)  Goal: Discharge Needs Assessment  Outcome: Ongoing (interventions implemented as appropriate)    Problem: Renal Failure/Kidney Injury, Acute (Adult)  Goal: Signs and Symptoms of Listed Potential Problems Will be Absent or Manageable (Renal Failure/Kidney Injury, Acute)  Outcome: Ongoing (interventions implemented as appropriate)    Problem: Fall Risk (Adult)  Goal: Absence of Falls  Outcome: Ongoing (interventions implemented as appropriate)    Problem: Pain, Acute (Adult)  Goal: Acceptable Pain Control/Comfort Level  Outcome: Ongoing (interventions implemented as appropriate)    Problem: Acute Alcohol Withdrawal Syndrome, Risk For/Actual (Adult)  Goal: Signs and Symptoms of Listed Potential Problems Will be Absent or Manageable (Acute Alcohol Withdrawal Syndrome, Risk For/Actual)  Outcome: Ongoing (interventions implemented as appropriate)

## 2017-09-11 ENCOUNTER — EPISODE CHANGES (OUTPATIENT)
Dept: CASE MANAGEMENT | Facility: OTHER | Age: 82
End: 2017-09-11

## 2017-09-11 NOTE — PROGRESS NOTES
Case Management Discharge Note    Final Note: d/c'd home    Discharge Placement     Facility/Agency Request Status Selected? Address Phone Number Fax Number    [facility] Accepted    Yes [address], [address], [facility] [address] [phone]             Discharge Codes: 06  Discharged/transferred to home under care of organized home health service organization in anticipation of skilled care

## 2017-09-14 ENCOUNTER — TELEPHONE (OUTPATIENT)
Dept: INTERNAL MEDICINE | Facility: CLINIC | Age: 82
End: 2017-09-14

## 2017-09-14 ENCOUNTER — OFFICE VISIT (OUTPATIENT)
Dept: INTERNAL MEDICINE | Facility: CLINIC | Age: 82
End: 2017-09-14

## 2017-09-14 VITALS
HEIGHT: 74 IN | WEIGHT: 185 LBS | SYSTOLIC BLOOD PRESSURE: 134 MMHG | BODY MASS INDEX: 23.74 KG/M2 | DIASTOLIC BLOOD PRESSURE: 70 MMHG | HEART RATE: 84 BPM

## 2017-09-14 DIAGNOSIS — L98.499: ICD-10-CM

## 2017-09-14 DIAGNOSIS — F10.20 ALCOHOLISM (HCC): Primary | ICD-10-CM

## 2017-09-14 DIAGNOSIS — E53.8 B12 DEFICIENCY: ICD-10-CM

## 2017-09-14 DIAGNOSIS — I10 BENIGN ESSENTIAL HYPERTENSION: ICD-10-CM

## 2017-09-14 PROBLEM — K76.0 HEPATIC STEATOSIS: Status: ACTIVE | Noted: 2017-09-14

## 2017-09-14 PROBLEM — N91.2 AMENIA: Status: RESOLVED | Noted: 2017-05-04 | Resolved: 2017-09-14

## 2017-09-14 PROBLEM — F10.939 ALCOHOL WITHDRAWAL (HCC): Status: RESOLVED | Noted: 2017-09-07 | Resolved: 2017-09-14

## 2017-09-14 PROBLEM — N17.9 AKI (ACUTE KIDNEY INJURY) (HCC): Status: RESOLVED | Noted: 2017-09-06 | Resolved: 2017-09-14

## 2017-09-14 PROBLEM — K80.20 CHOLELITHIASIS: Status: ACTIVE | Noted: 2017-09-14

## 2017-09-14 PROCEDURE — 99214 OFFICE O/P EST MOD 30 MIN: CPT | Performed by: INTERNAL MEDICINE

## 2017-09-14 PROCEDURE — 96372 THER/PROPH/DIAG INJ SC/IM: CPT | Performed by: INTERNAL MEDICINE

## 2017-09-14 RX ORDER — CYANOCOBALAMIN 1000 UG/ML
1000 INJECTION, SOLUTION INTRAMUSCULAR; SUBCUTANEOUS
Status: DISCONTINUED | OUTPATIENT
Start: 2017-09-14 | End: 2017-12-20

## 2017-09-14 RX ORDER — SULFAMETHOXAZOLE AND TRIMETHOPRIM 800; 160 MG/1; MG/1
1 TABLET ORAL 2 TIMES DAILY
Qty: 20 TABLET | Refills: 0 | Status: SHIPPED | OUTPATIENT
Start: 2017-09-14 | End: 2017-10-19

## 2017-09-14 RX ORDER — TRAMADOL HYDROCHLORIDE 50 MG/1
TABLET ORAL
COMMUNITY
Start: 2017-07-21 | End: 2017-12-01 | Stop reason: SDUPTHER

## 2017-09-14 RX ORDER — THIAMINE MONONITRATE (VIT B1) 100 MG
100 TABLET ORAL DAILY
Qty: 90 TABLET | Refills: 3 | Status: SHIPPED | OUTPATIENT
Start: 2017-09-14

## 2017-09-14 RX ORDER — LANOLIN ALCOHOL/MO/W.PET/CERES
50 CREAM (GRAM) TOPICAL DAILY
Qty: 90 TABLET | Refills: 3 | Status: SHIPPED | OUTPATIENT
Start: 2017-09-14

## 2017-09-14 RX ADMIN — CYANOCOBALAMIN 1000 MCG: 1000 INJECTION, SOLUTION INTRAMUSCULAR; SUBCUTANEOUS at 17:22

## 2017-09-14 NOTE — PROGRESS NOTES
Subjective   Neal Flores is a 82 y.o. male.     History of Present Illness he is here today for follow-up of recent hospitalization for alcoholism with a fall at home.  By his history he had been sober for some time and then began drinking prior to hospitalization.  He has been home approximately 5 days and slowly improving in his appetite and strength.  He has not had anything to drink since returning home.  He is using a walker at home though he is fairly unstable.  He has someone with him 24 hours per day fortunately.  He does have some lightheadedness but denies any headache or falls since being home.  He denies any focal neurologic symptoms.  He has not had any abdominal pain, diarrhea, melanotic stool, rectal bleeding, or nausea and vomiting.  He does have several skin lesions involving his legs and feet.  They are not painful or draining.  Dose of lisinopril was decreased to 10 mg per day while in hospital.        Review of Systems   Constitutional: Positive for appetite change. Negative for activity change, diaphoresis, fatigue and fever.   Respiratory: Negative for cough, chest tightness, shortness of breath and wheezing.    Cardiovascular: Positive for leg swelling. Negative for chest pain and palpitations.   Gastrointestinal: Negative for abdominal pain, anal bleeding, blood in stool, diarrhea, nausea and vomiting.   Genitourinary: Negative for flank pain and hematuria.   Musculoskeletal: Positive for back pain and gait problem. Negative for arthralgias.   Neurological: Positive for numbness. Negative for dizziness, syncope, facial asymmetry, speech difficulty, weakness and headaches.   Psychiatric/Behavioral: Negative for confusion and dysphoric mood. The patient is not nervous/anxious.        Objective   Physical Exam   Constitutional: He is oriented to person, place, and time. Vital signs are normal. He appears well-developed and well-nourished. He is active. He does not appear ill.   Eyes:  Conjunctivae are normal.   Cardiovascular: Normal rate, regular rhythm, S1 normal and S2 normal.  Exam reveals no S3 and no S4.    No murmur heard.  Pulses:       Dorsalis pedis pulses are 0 on the right side, and 0 on the left side.        Posterior tibial pulses are 0 on the right side, and 0 on the left side.   Pulmonary/Chest: No tachypnea. No respiratory distress. He has no decreased breath sounds. He has no wheezes. He has no rhonchi. He has no rales.   Abdominal: Normal appearance and bowel sounds are normal. He exhibits no abdominal bruit and no mass. There is no hepatosplenomegaly. There is no tenderness.       Vascular Status -  His exam exhibits no right foot edema. His exam exhibits left foot edema (Trace malleolar edema).  Neurological: He is alert and oriented to person, place, and time. He has normal strength. Gait abnormal.   He needs assistance in walking a few feet and his steps are short and narrow based.   Skin:        Psychiatric: He has a normal mood and affect. His speech is normal and behavior is normal. Judgment and thought content normal. Cognition and memory are normal.       Assessment/Plan CT scan of the head was unremarkable.  CT scan of the abdomen and pelvis showed fatty liver.  There was cholelithiasis.  There was a small left renal cyst.    Assessment #1 hypertension-good control #2 alcoholism-once again this was discussed with the patient and his caregiver.  Any further drinking can only approved for harmful to him #3 nutritional deficiencies #4 minor skin wounds with secondary infection    Plan #1 Bactrim double strength 1 by mouth twice a day for 10 days #2 Silvadene to each skin wound each morning #3 thiamine 100 mg by mouth daily #4 B6 50 mg by mouth daily #5 vitamin B12 1000 µg IM now.  #6 continue lisinopril 10 mg by mouth daily.  Routine follow-up with me in one week.  He minutes was spent in this visit.

## 2017-09-14 NOTE — TELEPHONE ENCOUNTER
----- Message from Sindy Cuello sent at 9/12/2017  3:25 PM EDT -----  Pt has hospital follow up scheduled this Thursday at 2:40.  Pts daughter  Jennifer Fraire would like to know if you could call her at the end of your day Thursday to discuss.  Pts family is not available to come in for appt but will have a neighbor bring pt in.    Jennifer Fraire [phone]

## 2017-09-14 NOTE — TELEPHONE ENCOUNTER
"Called Jeninfer Fraire and she wanted me to give you this information about Mr. Flores.    1.  Had fallen and ambulance was called.  He was just released from hospital on Sunday the 10th.  He has been drunk for 2 months.      2. He has a place on each foot that appears to be ulcers.  The hospital wanted pt to have wound care.  He declined.  However Mr. Flores is rethinking and thinks he does need [facility] to care for his wounds on both feet.  Both wounds are infected, according to hospital.    3. Mrs. Fraire stated that her father has had weakness for the last two months.      4. He also had a CT scan and it showed a \"thumb print\" above kidney.      5. Right leg is also swollen and Mr. Flores cannot walk at all.      "

## 2017-09-21 ENCOUNTER — OFFICE VISIT (OUTPATIENT)
Dept: INTERNAL MEDICINE | Facility: CLINIC | Age: 82
End: 2017-09-21

## 2017-09-21 VITALS
HEART RATE: 94 BPM | DIASTOLIC BLOOD PRESSURE: 78 MMHG | HEIGHT: 74 IN | SYSTOLIC BLOOD PRESSURE: 134 MMHG | BODY MASS INDEX: 23.74 KG/M2 | WEIGHT: 185 LBS

## 2017-09-21 DIAGNOSIS — E53.8 B12 DEFICIENCY: ICD-10-CM

## 2017-09-21 DIAGNOSIS — I10 BENIGN ESSENTIAL HYPERTENSION: ICD-10-CM

## 2017-09-21 DIAGNOSIS — F41.9 CHRONIC ANXIETY: ICD-10-CM

## 2017-09-21 DIAGNOSIS — F10.20 ALCOHOLISM (HCC): ICD-10-CM

## 2017-09-21 DIAGNOSIS — L98.499: Primary | ICD-10-CM

## 2017-09-21 PROCEDURE — 99213 OFFICE O/P EST LOW 20 MIN: CPT | Performed by: INTERNAL MEDICINE

## 2017-09-21 PROCEDURE — 96372 THER/PROPH/DIAG INJ SC/IM: CPT | Performed by: INTERNAL MEDICINE

## 2017-09-21 RX ORDER — CYANOCOBALAMIN 1000 UG/ML
1000 INJECTION, SOLUTION INTRAMUSCULAR; SUBCUTANEOUS ONCE
Status: COMPLETED | OUTPATIENT
Start: 2017-09-21 | End: 2017-09-21

## 2017-09-21 RX ADMIN — CYANOCOBALAMIN 1000 MCG: 1000 INJECTION, SOLUTION INTRAMUSCULAR; SUBCUTANEOUS at 16:10

## 2017-09-21 NOTE — PROGRESS NOTES
Subjective   eNal Flores is a 82 y.o. male.     History of Present Illness he is here today for follow-up of alcoholism, alcoholic peripheral neuropathy, skin ulceration, decreased mobility, hypertension, and chronic anxiety.  Over the last week he has been feeling somewhat better with increased appetite and some increase in strength when walking.  He is still using a walker.  He has not had any falls in the last week.  He is using Silvadene and Bactrim double strength as directed to his skin ulcers.  He has noticed feeling more anxious and actually shaking over the last few days.  He denies any dizziness, headache, syncope, abdominal pain, nausea and vomiting, or melanotic stool.         Review of Systems   Constitutional: Positive for appetite change. Negative for activity change, fatigue and fever.   Respiratory: Negative for cough, chest tightness, shortness of breath and wheezing.    Cardiovascular: Negative for chest pain, palpitations and leg swelling.   Gastrointestinal: Negative for abdominal pain, anal bleeding, blood in stool, diarrhea, nausea and vomiting.   Neurological: Positive for tremors and numbness. Negative for dizziness, syncope, facial asymmetry, speech difficulty, weakness and headaches.   Psychiatric/Behavioral: Negative for confusion and dysphoric mood. The patient is nervous/anxious.        Objective   Physical Exam   Constitutional: He is oriented to person, place, and time. Vital signs are normal. He appears well-developed and well-nourished. He is active. He does not appear ill.   Eyes: Conjunctivae are normal.   Cardiovascular: Normal rate, regular rhythm, S1 normal and S2 normal.  Exam reveals no S3 and no S4.    No murmur heard.  Pulmonary/Chest: No tachypnea. No respiratory distress. He has no decreased breath sounds. He has no wheezes. He has no rhonchi. He has no rales.   Abdominal: Soft. Normal appearance. There is no tenderness.       Vascular Status -  His exam exhibits no  right foot edema. His exam exhibits no left foot edema.  Neurological: He is alert and oriented to person, place, and time.   Skin:        Psychiatric: His speech is normal and behavior is normal. Judgment and thought content normal. His mood appears anxious. Cognition and memory are normal.       Assessment/Plan assessment #1 chronic alcoholism-worsening anxiety and tremor secondary to abstinence #2 hypertension-controlled #3 skin abrasions and ulcerations-healing    Plan #1 complete course of Bactrim and continue to use Silvadene daily over skin wounds #2 continue thiamine 100 mg by mouth daily and B6 50 mg by mouth daily #3 B12 1000 µg IM now #4 Valium 1 mg by mouth 3 times a day.  #5 continue to use walker for all ambulation.  Routine follow-up with me in one month.

## 2017-10-19 ENCOUNTER — OFFICE VISIT (OUTPATIENT)
Dept: INTERNAL MEDICINE | Facility: CLINIC | Age: 82
End: 2017-10-19

## 2017-10-19 VITALS
HEART RATE: 70 BPM | BODY MASS INDEX: 24.13 KG/M2 | OXYGEN SATURATION: 98 % | DIASTOLIC BLOOD PRESSURE: 76 MMHG | SYSTOLIC BLOOD PRESSURE: 174 MMHG | WEIGHT: 188 LBS | HEIGHT: 74 IN

## 2017-10-19 DIAGNOSIS — F10.20 ALCOHOLISM (HCC): Primary | ICD-10-CM

## 2017-10-19 DIAGNOSIS — I10 BENIGN ESSENTIAL HYPERTENSION: ICD-10-CM

## 2017-10-19 DIAGNOSIS — Z23 NEED FOR VACCINATION: ICD-10-CM

## 2017-10-19 DIAGNOSIS — F41.9 CHRONIC ANXIETY: ICD-10-CM

## 2017-10-19 DIAGNOSIS — E53.8 B12 DEFICIENCY: ICD-10-CM

## 2017-10-19 PROBLEM — L98.499 SKIN ULCERATION (HCC): Status: RESOLVED | Noted: 2017-09-14 | Resolved: 2017-10-19

## 2017-10-19 PROCEDURE — 90662 IIV NO PRSV INCREASED AG IM: CPT | Performed by: INTERNAL MEDICINE

## 2017-10-19 PROCEDURE — G0008 ADMIN INFLUENZA VIRUS VAC: HCPCS | Performed by: INTERNAL MEDICINE

## 2017-10-19 PROCEDURE — 96372 THER/PROPH/DIAG INJ SC/IM: CPT | Performed by: INTERNAL MEDICINE

## 2017-10-19 PROCEDURE — 99213 OFFICE O/P EST LOW 20 MIN: CPT | Performed by: INTERNAL MEDICINE

## 2017-10-19 RX ORDER — CYANOCOBALAMIN 1000 UG/ML
1000 INJECTION, SOLUTION INTRAMUSCULAR; SUBCUTANEOUS
Status: DISCONTINUED | OUTPATIENT
Start: 2017-10-19 | End: 2017-12-19

## 2017-10-19 RX ORDER — DIAZEPAM 10 MG/1
10 TABLET ORAL 3 TIMES DAILY
COMMUNITY
Start: 2017-09-21 | End: 2018-01-11 | Stop reason: SDUPTHER

## 2017-10-19 RX ADMIN — CYANOCOBALAMIN 1000 MCG: 1000 INJECTION, SOLUTION INTRAMUSCULAR; SUBCUTANEOUS at 14:44

## 2017-10-19 NOTE — PROGRESS NOTES
Subjective   Neal Flores is a 82 y.o. male.     History of Present Illness he is here today for follow-up of hypertension, chronic alcoholism, and chronic anxiety.  His girlfriend has helped him a great deal.  He remains off alcohol and all of his skin wounds have healed.  He is taking his daily vitamins on a regular basis.  He denies any dizziness or focal neurologic symptoms.  He has not had any syncope.  He denies any dyspnea or chest pain.  He is now walking with a cane.  His appetite has improved.  His home blood pressure readings are 130s over 70s generally.  He is not on any medication at present for hypertension.        Review of Systems   Constitutional: Positive for activity change and appetite change. Negative for fatigue.   HENT: Negative for trouble swallowing.    Respiratory: Negative for cough, chest tightness, shortness of breath and wheezing.    Cardiovascular: Negative for chest pain, palpitations and leg swelling.   Gastrointestinal: Negative for abdominal pain, diarrhea and nausea.   Genitourinary: Negative for flank pain and hematuria.   Musculoskeletal: Positive for back pain and gait problem. Negative for arthralgias.   Neurological: Positive for numbness. Negative for dizziness, syncope, facial asymmetry, speech difficulty, weakness and headaches.   Psychiatric/Behavioral: Negative for dysphoric mood. The patient is not nervous/anxious.        Objective   Physical Exam   Constitutional: He is oriented to person, place, and time. He appears well-developed and well-nourished. He is active. He does not appear ill.   Eyes: Conjunctivae are normal.   Cardiovascular: Normal rate, regular rhythm, S1 normal and S2 normal.  Exam reveals no S3 and no S4.    No murmur heard.  Pulses:       Dorsalis pedis pulses are 0 on the right side, and 0 on the left side.        Posterior tibial pulses are 0 on the right side, and 0 on the left side.   Pulmonary/Chest: No tachypnea. No respiratory distress. He has  no decreased breath sounds. He has no wheezes. He has no rhonchi. He has no rales.   Abdominal: Soft. Normal appearance and bowel sounds are normal. He exhibits no abdominal bruit and no mass. There is no hepatosplenomegaly. There is no tenderness.       Vascular Status -  His exam exhibits no right foot edema. His exam exhibits no left foot edema.  Neurological: He is alert and oriented to person, place, and time. Gait abnormal.   Mild to moderate slowing with some shortening of his steps and decreased stride.  However his gait is stable.   Psychiatric: He has a normal mood and affect. His speech is normal and behavior is normal. Judgment and thought content normal. Cognition and memory are normal.       Assessment/Plan spent #1 hypertension-good control per out of office readings #2 chronic alcoholism-she remains in remission #3 chronic anxiety-much improved with Valium.  Overall he has done very well.    Plan #1 flu vaccination today #2 B12 1000 µg IM today.  #3 no change in regular medication.  Routine follow-up with me in 2 months.

## 2017-11-16 ENCOUNTER — OFFICE VISIT (OUTPATIENT)
Dept: INTERNAL MEDICINE | Facility: CLINIC | Age: 82
End: 2017-11-16

## 2017-11-16 ENCOUNTER — APPOINTMENT (OUTPATIENT)
Dept: WOMENS IMAGING | Facility: HOSPITAL | Age: 82
End: 2017-11-16

## 2017-11-16 VITALS
TEMPERATURE: 98.2 F | HEART RATE: 80 BPM | HEIGHT: 74 IN | BODY MASS INDEX: 24 KG/M2 | SYSTOLIC BLOOD PRESSURE: 126 MMHG | DIASTOLIC BLOOD PRESSURE: 58 MMHG | WEIGHT: 187 LBS

## 2017-11-16 DIAGNOSIS — R05.9 COUGH: Primary | ICD-10-CM

## 2017-11-16 DIAGNOSIS — E53.8 B12 DEFICIENCY: ICD-10-CM

## 2017-11-16 PROCEDURE — 99213 OFFICE O/P EST LOW 20 MIN: CPT | Performed by: INTERNAL MEDICINE

## 2017-11-16 PROCEDURE — 96372 THER/PROPH/DIAG INJ SC/IM: CPT | Performed by: INTERNAL MEDICINE

## 2017-11-16 PROCEDURE — 71020 XR CHEST PA AND LATERAL: CPT | Performed by: INTERNAL MEDICINE

## 2017-11-16 PROCEDURE — 71020 CHG CHEST X-RAY 2 VW: CPT | Performed by: RADIOLOGY

## 2017-11-16 RX ORDER — METHYLPREDNISOLONE SODIUM SUCCINATE 125 MG/2ML
125 INJECTION, POWDER, LYOPHILIZED, FOR SOLUTION INTRAMUSCULAR; INTRAVENOUS ONCE
Status: COMPLETED | OUTPATIENT
Start: 2017-11-16 | End: 2017-11-16

## 2017-11-16 RX ORDER — AZITHROMYCIN 250 MG/1
TABLET, FILM COATED ORAL
Qty: 6 TABLET | Refills: 0 | Status: SHIPPED | OUTPATIENT
Start: 2017-11-16 | End: 2017-12-19

## 2017-11-16 RX ORDER — PREDNISONE 10 MG/1
TABLET ORAL
Qty: 30 TABLET | Refills: 0 | Status: SHIPPED | OUTPATIENT
Start: 2017-11-16 | End: 2017-12-19

## 2017-11-16 RX ORDER — CYANOCOBALAMIN 1000 UG/ML
1000 INJECTION, SOLUTION INTRAMUSCULAR; SUBCUTANEOUS ONCE
Status: COMPLETED | OUTPATIENT
Start: 2017-11-16 | End: 2017-11-16

## 2017-11-16 RX ADMIN — CYANOCOBALAMIN 1000 MCG: 1000 INJECTION, SOLUTION INTRAMUSCULAR; SUBCUTANEOUS at 15:47

## 2017-11-16 RX ADMIN — METHYLPREDNISOLONE SODIUM SUCCINATE 125 MG: 125 INJECTION, POWDER, LYOPHILIZED, FOR SOLUTION INTRAMUSCULAR; INTRAVENOUS at 15:45

## 2017-11-16 NOTE — PROGRESS NOTES
Subjective   Neal Flores is a 82 y.o. male.     History of Present Illness he relates a 5 day history of cough which is dry as well as wheezing.  He has mild clear rhinorrhea.  He has a scratchy throat.  He denies any PND or dyspnea on exertion.  He still smokes a cigarette occasionally.        Review of Systems   Constitutional: Negative for activity change, appetite change, chills, diaphoresis, fatigue and fever.   Respiratory: Positive for cough and wheezing. Negative for chest tightness and shortness of breath.    Cardiovascular: Negative for chest pain, palpitations and leg swelling.   Gastrointestinal: Negative for abdominal pain, diarrhea, nausea and vomiting.   Neurological: Negative for dizziness, syncope, weakness and headaches.       Objective   Physical Exam   Constitutional: He is oriented to person, place, and time. Vital signs are normal. He appears well-developed. He appears cachectic. He is active. He does not appear ill.   Eyes: Conjunctivae are normal.   Cardiovascular: Normal rate, regular rhythm, S1 normal and S2 normal.  Exam reveals no S3 and no S4.    No murmur heard.  Pulmonary/Chest: No tachypnea. No respiratory distress. He has no decreased breath sounds. He has wheezes. He has rales in the right middle field and the right lower field.   Abdominal: Soft. Normal appearance and bowel sounds are normal. He exhibits no abdominal bruit and no mass. There is no hepatosplenomegaly. There is no tenderness.       Vascular Status -  His exam exhibits no right foot edema. His exam exhibits no left foot edema.  Neurological: He is alert and oriented to person, place, and time. Gait abnormal.   Moderate slowing in walk but stable   Psychiatric: He has a normal mood and affect. His speech is normal and behavior is normal. Judgment and thought content normal. Cognition and memory are normal.       Assessment/Plan O2 sat 98% at rest and 96% post ambulation.  Chest x-ray is normal    Assessment #1  asthmatic bronchitis    Plan #1 Solu-Medrol 40 mg IM now #2 Z-Harsha 5 day #3 prednisone 40 mg by mouth daily followed by 30 mg by mouth daily followed by 20 mg by mouth daily followed by 10 mg by mouth daily.  He will call in 4 days if symptoms are not improving.

## 2017-11-26 DIAGNOSIS — F39 MOOD DISORDER (HCC): ICD-10-CM

## 2017-11-27 RX ORDER — ESCITALOPRAM OXALATE 20 MG/1
TABLET ORAL
Qty: 90 TABLET | Refills: 1 | Status: SHIPPED | OUTPATIENT
Start: 2017-11-27 | End: 2018-06-11 | Stop reason: SDUPTHER

## 2017-12-04 RX ORDER — TRAMADOL HYDROCHLORIDE 50 MG/1
TABLET ORAL
Qty: 50 TABLET | Refills: 5 | Status: SHIPPED | OUTPATIENT
Start: 2017-12-04 | End: 2017-12-19

## 2017-12-19 ENCOUNTER — OFFICE VISIT (OUTPATIENT)
Dept: INTERNAL MEDICINE | Facility: CLINIC | Age: 82
End: 2017-12-19

## 2017-12-19 VITALS
WEIGHT: 182 LBS | HEIGHT: 74 IN | OXYGEN SATURATION: 98 % | BODY MASS INDEX: 23.36 KG/M2 | SYSTOLIC BLOOD PRESSURE: 180 MMHG | HEART RATE: 80 BPM | DIASTOLIC BLOOD PRESSURE: 90 MMHG

## 2017-12-19 DIAGNOSIS — I48.0 PAF (PAROXYSMAL ATRIAL FIBRILLATION) (HCC): ICD-10-CM

## 2017-12-19 DIAGNOSIS — E53.8 VITAMIN B 12 DEFICIENCY: ICD-10-CM

## 2017-12-19 DIAGNOSIS — I10 BENIGN ESSENTIAL HYPERTENSION: ICD-10-CM

## 2017-12-19 DIAGNOSIS — F41.9 CHRONIC ANXIETY: ICD-10-CM

## 2017-12-19 DIAGNOSIS — F10.20 ALCOHOLISM (HCC): Primary | ICD-10-CM

## 2017-12-19 PROCEDURE — 99213 OFFICE O/P EST LOW 20 MIN: CPT | Performed by: INTERNAL MEDICINE

## 2017-12-19 PROCEDURE — 96372 THER/PROPH/DIAG INJ SC/IM: CPT | Performed by: INTERNAL MEDICINE

## 2017-12-19 NOTE — PROGRESS NOTES
Subjective   Neal Flores is a 82 y.o. male.     History of Present Illness he is here today for follow-up of hypertension, chronic anxiety, diffuse osteoarthritis, vitamin B12 deficiency, and PAF.  Since he has stopped drinking alcohol heat has done amazingly well.  He denies any falls or balance difficulties.  He does use a walker when walking outside of his home.  His blood pressure is been 140s systolic over 70s to 80s diastolic.  Unfortunately tramadol being used for his chronic arthritic pain caused abdominal discomfort and that was discontinued.  Presently he has right shoulder and right hip pain especially bothersome.  His recent asthmatic bronchitis resolved on prednisone.        Review of Systems   Constitutional: Negative for activity change, appetite change and fatigue.   Respiratory: Negative for cough, chest tightness, shortness of breath and wheezing.    Gastrointestinal: Negative for abdominal pain, diarrhea, nausea and vomiting.   Genitourinary: Negative for flank pain and hematuria.   Musculoskeletal: Positive for arthralgias and back pain. Negative for gait problem.   Neurological: Negative for dizziness, syncope, facial asymmetry, speech difficulty, weakness, numbness and headaches.   Psychiatric/Behavioral: Negative for dysphoric mood. The patient is not nervous/anxious.        Objective   Physical Exam   Constitutional: He is oriented to person, place, and time. He appears well-developed and well-nourished. He is active. He does not appear ill.   Eyes: Conjunctivae are normal.   Cardiovascular: Normal rate, regular rhythm, S1 normal and S2 normal.  Exam reveals no S3 and no S4.    No murmur heard.  Pulmonary/Chest: No tachypnea. No respiratory distress. He has no decreased breath sounds. He has no wheezes. He has no rhonchi. He has no rales.   Abdominal: Soft. Normal appearance and bowel sounds are normal. He exhibits no abdominal bruit and no mass. There is no hepatosplenomegaly. There is no  tenderness.       Vascular Status -  His exam exhibits no right foot edema. His exam exhibits no left foot edema.  Neurological: He is alert and oriented to person, place, and time.   Psychiatric: He has a normal mood and affect. His speech is normal and behavior is normal. Judgment and thought content normal. Cognition and memory are normal.       Assessment/Plan assessment #1 hypertension-good out of office readings without medication #2 history of PAF-without recurrence #3 osteoarthritis-his main subjective complaint and we shall not be using narcotic medication.    Plan #1 continue vitamin B12 1000 µg IM monthly .  #2 Tylenol extra strength one by mouth 4 times a day when necessary joint pain.  Routine follow-up with me in 2 months.

## 2017-12-20 RX ORDER — CYANOCOBALAMIN 1000 UG/ML
1000 INJECTION, SOLUTION INTRAMUSCULAR; SUBCUTANEOUS ONCE
Status: COMPLETED | OUTPATIENT
Start: 2017-12-20 | End: 2017-12-20

## 2017-12-20 RX ADMIN — CYANOCOBALAMIN 1000 MCG: 1000 INJECTION, SOLUTION INTRAMUSCULAR; SUBCUTANEOUS at 07:40

## 2018-01-12 RX ORDER — DIAZEPAM 10 MG/1
TABLET ORAL
Qty: 30 TABLET | Refills: 5 | Status: SHIPPED | OUTPATIENT
Start: 2018-01-12 | End: 2018-07-15 | Stop reason: SDUPTHER

## 2018-02-05 ENCOUNTER — EPISODE CHANGES (OUTPATIENT)
Dept: CASE MANAGEMENT | Facility: OTHER | Age: 83
End: 2018-02-05

## 2018-02-07 RX ORDER — TRAMADOL HYDROCHLORIDE 50 MG/1
TABLET ORAL
Qty: 50 TABLET | Refills: 0 | Status: SHIPPED | OUTPATIENT
Start: 2018-02-07 | End: 2018-03-05 | Stop reason: SDUPTHER

## 2018-02-07 NOTE — TELEPHONE ENCOUNTER
Please advise refill   Last OV 12/19/2017  Last fill 12/05/2017 corey 50  tanya on its way in folder to your desk

## 2018-02-14 ENCOUNTER — OFFICE VISIT (OUTPATIENT)
Dept: INTERNAL MEDICINE | Facility: CLINIC | Age: 83
End: 2018-02-14

## 2018-02-14 ENCOUNTER — APPOINTMENT (OUTPATIENT)
Dept: CARDIOLOGY | Facility: HOSPITAL | Age: 83
End: 2018-02-14
Attending: INTERNAL MEDICINE

## 2018-02-14 VITALS
SYSTOLIC BLOOD PRESSURE: 126 MMHG | HEIGHT: 74 IN | BODY MASS INDEX: 22.46 KG/M2 | WEIGHT: 175 LBS | DIASTOLIC BLOOD PRESSURE: 64 MMHG

## 2018-02-14 DIAGNOSIS — I48.0 PAF (PAROXYSMAL ATRIAL FIBRILLATION) (HCC): ICD-10-CM

## 2018-02-14 DIAGNOSIS — I82.492 DEEP VEIN THROMBOSIS (DVT) OF OTHER VEIN OF LEFT LOWER EXTREMITY, UNSPECIFIED CHRONICITY (HCC): ICD-10-CM

## 2018-02-14 DIAGNOSIS — Z86.72 HISTORY OF DEEP VEIN THROMBOPHLEBITIS OF LOWER EXTREMITY: ICD-10-CM

## 2018-02-14 DIAGNOSIS — I10 BENIGN ESSENTIAL HYPERTENSION: Primary | ICD-10-CM

## 2018-02-14 DIAGNOSIS — M51.36 DEGENERATION OF INTERVERTEBRAL DISC OF LUMBAR REGION: ICD-10-CM

## 2018-02-14 PROCEDURE — 99214 OFFICE O/P EST MOD 30 MIN: CPT | Performed by: INTERNAL MEDICINE

## 2018-02-14 NOTE — PROGRESS NOTES
Subjective   Neal Flores is a 82 y.o. male.     History of Present Illness he is here today for his yearly follow-up of hypertension, alcoholism, history of deep vein thrombophlebitis, history of paroxysmal atrial fibrillation, and chronic anxiety.  In general he has done well.  Over the last few weeks he has had some suprapubic cramping lasting perhaps 30 minutes and occurring daily.  It is not associated with urination or defecation.  There has been no dysuria or urinary frequency.  He does go to the bathroom 2 or 3 times a night but he drinks fluids before going to bed.  He denied any hematuria, urinary hesitancy, or sensation of incomplete voiding.  There has been no rectal bleeding, melanotic stool, nausea and vomiting, or dysphagia.  Generally he has 1-2 bowel movements per day although occasionally he may have 2 or 3 softer stools.  His appetite is been good and his weight is stable.  He has had a mild right-sided headache over the last 3 or 4 days but no dizziness or syncope.  He denies any focal neurologic episodes.  He has dyspnea when walking 100 yards but denies any PND, cough, wheezing, PND, or chest pain.  He has had some swelling in the left lower extremity over the last 2 weeks without associated pain.        Review of Systems   Constitutional: Negative for activity change, appetite change, fatigue and fever.   HENT: Negative for trouble swallowing.    Respiratory: Positive for shortness of breath. Negative for cough, chest tightness and wheezing.    Cardiovascular: Positive for leg swelling. Negative for chest pain and palpitations.   Gastrointestinal: Positive for abdominal pain. Negative for anal bleeding, blood in stool, constipation, diarrhea, nausea and vomiting.   Genitourinary: Negative for difficulty urinating, dysuria, flank pain and hematuria.   Musculoskeletal: Positive for arthralgias, back pain and gait problem.   Neurological: Positive for numbness and headaches. Negative for  dizziness, syncope, facial asymmetry, speech difficulty and weakness.   Psychiatric/Behavioral: Negative for dysphoric mood. The patient is not nervous/anxious.        Objective   Physical Exam   Constitutional: He is oriented to person, place, and time. Vital signs are normal. He appears well-developed and well-nourished. He is active. He does not appear ill.   Eyes: Conjunctivae are normal.   Fundoscopic exam:       The right eye shows no AV nicking and no hemorrhage.        The left eye shows no AV nicking, no exudate and no hemorrhage.   Neck: Carotid bruit is not present. No thyroid mass and no thyromegaly present.   Cardiovascular: Normal rate, regular rhythm, S1 normal and S2 normal.  Exam reveals no S3 and no S4.    No murmur heard.  Pulses:       Dorsalis pedis pulses are 2+ on the right side, and 0 on the left side.        Posterior tibial pulses are 0 on the left side.   Pulmonary/Chest: No tachypnea. No respiratory distress. He has no decreased breath sounds. He has no wheezes. He has no rhonchi. He has no rales.   Abdominal: Soft. Normal appearance and bowel sounds are normal. He exhibits no abdominal bruit and no mass. There is no hepatosplenomegaly. There is no tenderness.       Vascular Status -  His exam exhibits no right foot edema. His exam exhibits left foot edema (1+ pretibial edema).  Neurological: He is alert and oriented to person, place, and time.   Psychiatric: He has a normal mood and affect. His speech is normal and behavior is normal. Judgment and thought content normal. Cognition and memory are normal.       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  He is moderately limited by this problem however #4 history of deep vein thrombophlebitis-left lower extremity edema likely venous  insufficiency but of course deep vein thrombophlebitis is always a possibility    Plan #1 no change medication #2 venous Doppler of the left lower extremity now.  Routine follow-up with me in 4 months.

## 2018-02-15 DIAGNOSIS — Z86.718 HISTORY OF DVT (DEEP VEIN THROMBOSIS): Primary | ICD-10-CM

## 2018-02-16 ENCOUNTER — HOSPITAL ENCOUNTER (OUTPATIENT)
Dept: CARDIOLOGY | Facility: HOSPITAL | Age: 83
Discharge: HOME | End: 2018-02-16
Attending: INTERNAL MEDICINE | Admitting: INTERNAL MEDICINE

## 2018-02-16 DIAGNOSIS — Z86.718 HISTORY OF DVT (DEEP VEIN THROMBOSIS): ICD-10-CM

## 2018-02-16 LAB
BH CV LOW VAS LEFT COMMON FEMORAL SPONT: 1
BH CV LOW VAS LEFT DISTAL FEMORAL SPONT: 1
BH CV LOW VAS LEFT EXTERNAL ILIAC SPONT: 1
BH CV LOW VAS LEFT MID FEMORAL SPONT: 1
BH CV LOW VAS LEFT POPLITEAL SPONT: 1
BH CV LOW VAS LEFT PROXIMAL FEMORAL SPONT: 1
BH CV LOW VAS LEFT SAPHENOFEMORAL JUNCTION SPONT: 1
BH CV LOWER VASCULAR LEFT COMMON FEMORAL COMPRESS: NORMAL
BH CV LOWER VASCULAR LEFT COMMON FEMORAL PHASIC: NORMAL
BH CV LOWER VASCULAR LEFT COMMON FEMORAL SPONT: NORMAL
BH CV LOWER VASCULAR LEFT COMMON FEMORAL THROMBUS: NORMAL
BH CV LOWER VASCULAR LEFT DISTAL FEMORAL AUGMENT: NORMAL
BH CV LOWER VASCULAR LEFT DISTAL FEMORAL COMPETENT: NORMAL
BH CV LOWER VASCULAR LEFT DISTAL FEMORAL COMPRESS: NORMAL
BH CV LOWER VASCULAR LEFT DISTAL FEMORAL PHASIC: NORMAL
BH CV LOWER VASCULAR LEFT DISTAL FEMORAL SPONT: NORMAL
BH CV LOWER VASCULAR LEFT DISTAL FEMORAL THROMBUS: NORMAL
BH CV LOWER VASCULAR LEFT EXTERNAL ILIAC PHASIC: NORMAL
BH CV LOWER VASCULAR LEFT EXTERNAL ILIAC SPONT: NORMAL
BH CV LOWER VASCULAR LEFT GASTRONEMIUS COMPRESS: NORMAL
BH CV LOWER VASCULAR LEFT GREATER SAPH AK COMPRESS: NORMAL
BH CV LOWER VASCULAR LEFT GREATER SAPH BK COMPRESS: NORMAL
BH CV LOWER VASCULAR LEFT LESSER SAPH COMPRESS: NORMAL
BH CV LOWER VASCULAR LEFT MID FEMORAL AUGMENT: NORMAL
BH CV LOWER VASCULAR LEFT MID FEMORAL COMPETENT: NORMAL
BH CV LOWER VASCULAR LEFT MID FEMORAL COMPRESS: NORMAL
BH CV LOWER VASCULAR LEFT MID FEMORAL PHASIC: NORMAL
BH CV LOWER VASCULAR LEFT MID FEMORAL SPONT: NORMAL
BH CV LOWER VASCULAR LEFT MID FEMORAL THROMBUS: NORMAL
BH CV LOWER VASCULAR LEFT PERONEAL COMPRESS: NORMAL
BH CV LOWER VASCULAR LEFT POPLITEAL AUGMENT: NORMAL
BH CV LOWER VASCULAR LEFT POPLITEAL COMPETENT: NORMAL
BH CV LOWER VASCULAR LEFT POPLITEAL COMPRESS: NORMAL
BH CV LOWER VASCULAR LEFT POPLITEAL PHASIC: NORMAL
BH CV LOWER VASCULAR LEFT POPLITEAL SPONT: NORMAL
BH CV LOWER VASCULAR LEFT POPLITEAL THROMBUS: NORMAL
BH CV LOWER VASCULAR LEFT POSTERIOR TIBIAL COMPRESS: NORMAL
BH CV LOWER VASCULAR LEFT PROXIMAL FEMORAL COMPRESS: NORMAL
BH CV LOWER VASCULAR LEFT PROXIMAL FEMORAL THROMBUS: NORMAL
BH CV LOWER VASCULAR LEFT SAPHENOFEMORAL JUNCTION COMPRESS: NORMAL
BH CV LOWER VASCULAR LEFT SAPHENOFEMORAL JUNCTION PHASIC: NORMAL
BH CV LOWER VASCULAR LEFT SAPHENOFEMORAL JUNCTION SPONT: NORMAL
BH CV LOWER VASCULAR LEFT SAPHENOFEMORAL JUNCTION THROMBUS: NORMAL
BH CV LOWER VASCULAR RIGHT COMMON FEMORAL AUGMENT: NORMAL
BH CV LOWER VASCULAR RIGHT COMMON FEMORAL COMPETENT: NORMAL
BH CV LOWER VASCULAR RIGHT COMMON FEMORAL COMPRESS: NORMAL
BH CV LOWER VASCULAR RIGHT COMMON FEMORAL PHASIC: NORMAL
BH CV LOWER VASCULAR RIGHT COMMON FEMORAL SPONT: NORMAL

## 2018-02-16 PROCEDURE — 93971 EXTREMITY STUDY: CPT

## 2018-03-06 RX ORDER — TRAMADOL HYDROCHLORIDE 50 MG/1
TABLET ORAL
Qty: 50 TABLET | Refills: 0 | Status: SHIPPED | OUTPATIENT
Start: 2018-03-08

## 2018-05-07 RX ORDER — SIMVASTATIN 10 MG
TABLET ORAL
Qty: 90 TABLET | Refills: 3 | Status: SHIPPED | OUTPATIENT
Start: 2018-05-07

## 2018-05-15 ENCOUNTER — EPISODE CHANGES (OUTPATIENT)
Dept: CASE MANAGEMENT | Facility: OTHER | Age: 83
End: 2018-05-15

## 2018-06-11 DIAGNOSIS — F39 MOOD DISORDER (HCC): ICD-10-CM

## 2018-06-11 RX ORDER — ESCITALOPRAM OXALATE 20 MG/1
TABLET ORAL
Qty: 90 TABLET | Refills: 1 | Status: SHIPPED | OUTPATIENT
Start: 2018-06-11

## 2018-07-16 RX ORDER — DIAZEPAM 10 MG/1
TABLET ORAL
Qty: 30 TABLET | Refills: 5 | Status: SHIPPED | OUTPATIENT
Start: 2018-07-16

## 2018-07-16 NOTE — TELEPHONE ENCOUNTER
Please advise RX refill  Last ov 02/14/2018  Next ov not scheduled  Last filled 06/02/2018  Qty 30

## 2018-07-27 ENCOUNTER — TELEPHONE (OUTPATIENT)
Dept: INTERNAL MEDICINE | Facility: CLINIC | Age: 83
End: 2018-07-27

## 2018-07-27 NOTE — TELEPHONE ENCOUNTER
----- Message from Lupe Tolentino sent at 7/26/2018 11:35 AM EDT -----  Contact: Charlinemi  Patients daughter Jennifer calling to see if Dr. Cruz would write a prescription for a back brace. Given his history according to daughter he has back problems and its hard to get him in and out of the car. Please advise    Jennifer:[phone]

## 2018-10-09 ENCOUNTER — EPISODE CHANGES (OUTPATIENT)
Dept: CASE MANAGEMENT | Facility: OTHER | Age: 83
End: 2018-10-09

## 2021-04-02 ENCOUNTER — INPATIENT HOSPITAL (OUTPATIENT)
Dept: URBAN - METROPOLITAN AREA HOSPITAL 107 | Facility: HOSPITAL | Age: 86
End: 2021-04-02

## 2021-04-02 DIAGNOSIS — K44.9 DIAPHRAGMATIC HERNIA WITHOUT OBSTRUCTION OR GANGRENE: ICD-10-CM

## 2021-04-02 DIAGNOSIS — R13.10 DYSPHAGIA, UNSPECIFIED: ICD-10-CM

## 2021-04-02 DIAGNOSIS — K22.2 ESOPHAGEAL OBSTRUCTION: ICD-10-CM

## 2021-04-02 DIAGNOSIS — Z87.11 PERSONAL HISTORY OF PEPTIC ULCER DISEASE: ICD-10-CM

## 2021-04-02 DIAGNOSIS — K21.00 GASTRO-ESOPHAGEAL REFLUX DISEASE WITH ESOPHAGITIS, WITHOUT B: ICD-10-CM

## 2021-04-02 DIAGNOSIS — K29.80 DUODENITIS WITHOUT BLEEDING: ICD-10-CM

## 2021-04-02 DIAGNOSIS — K20.80 OTHER ESOPHAGITIS WITHOUT BLEEDING: ICD-10-CM

## 2021-04-02 PROCEDURE — 43249 ESOPH EGD DILATION <30 MM: CPT | Performed by: INTERNAL MEDICINE

## 2021-04-02 PROCEDURE — 99223 1ST HOSP IP/OBS HIGH 75: CPT | Mod: 25

## 2021-04-02 PROCEDURE — 43235 EGD DIAGNOSTIC BRUSH WASH: CPT | Performed by: INTERNAL MEDICINE

## 2021-04-03 ENCOUNTER — INPATIENT HOSPITAL (OUTPATIENT)
Dept: URBAN - METROPOLITAN AREA HOSPITAL 107 | Facility: HOSPITAL | Age: 86
End: 2021-04-03

## 2021-04-03 DIAGNOSIS — Z87.11 PERSONAL HISTORY OF PEPTIC ULCER DISEASE: ICD-10-CM

## 2021-04-03 DIAGNOSIS — K20.80 OTHER ESOPHAGITIS WITHOUT BLEEDING: ICD-10-CM

## 2021-04-03 DIAGNOSIS — K22.2 ESOPHAGEAL OBSTRUCTION: ICD-10-CM

## 2021-04-03 DIAGNOSIS — R13.10 DYSPHAGIA, UNSPECIFIED: ICD-10-CM

## 2021-04-03 PROCEDURE — 99232 SBSQ HOSP IP/OBS MODERATE 35: CPT | Performed by: INTERNAL MEDICINE

## 2021-04-04 ENCOUNTER — INPATIENT HOSPITAL (OUTPATIENT)
Dept: URBAN - METROPOLITAN AREA HOSPITAL 107 | Facility: HOSPITAL | Age: 86
End: 2021-04-04

## 2021-04-04 DIAGNOSIS — R19.7 DIARRHEA, UNSPECIFIED: ICD-10-CM

## 2021-04-04 DIAGNOSIS — Z87.11 PERSONAL HISTORY OF PEPTIC ULCER DISEASE: ICD-10-CM

## 2021-04-04 DIAGNOSIS — K22.2 ESOPHAGEAL OBSTRUCTION: ICD-10-CM

## 2021-04-04 DIAGNOSIS — K20.80 OTHER ESOPHAGITIS WITHOUT BLEEDING: ICD-10-CM

## 2021-04-04 DIAGNOSIS — R13.10 DYSPHAGIA, UNSPECIFIED: ICD-10-CM

## 2021-04-04 PROCEDURE — 99232 SBSQ HOSP IP/OBS MODERATE 35: CPT | Performed by: INTERNAL MEDICINE

## 2021-04-05 ENCOUNTER — INPATIENT HOSPITAL (OUTPATIENT)
Dept: URBAN - METROPOLITAN AREA HOSPITAL 107 | Facility: HOSPITAL | Age: 86
End: 2021-04-05

## 2021-04-05 DIAGNOSIS — K20.80 OTHER ESOPHAGITIS WITHOUT BLEEDING: ICD-10-CM

## 2021-04-05 DIAGNOSIS — R13.10 DYSPHAGIA, UNSPECIFIED: ICD-10-CM

## 2021-04-05 DIAGNOSIS — R19.7 DIARRHEA, UNSPECIFIED: ICD-10-CM

## 2021-04-05 PROCEDURE — 99232 SBSQ HOSP IP/OBS MODERATE 35: CPT | Performed by: PHYSICIAN ASSISTANT

## 2021-04-06 ENCOUNTER — INPATIENT HOSPITAL (OUTPATIENT)
Dept: URBAN - METROPOLITAN AREA HOSPITAL 107 | Facility: HOSPITAL | Age: 86
End: 2021-04-06

## 2021-04-06 DIAGNOSIS — R13.10 DYSPHAGIA, UNSPECIFIED: ICD-10-CM

## 2021-04-06 DIAGNOSIS — K22.2 ESOPHAGEAL OBSTRUCTION: ICD-10-CM

## 2021-04-06 PROCEDURE — 43266 EGD ENDOSCOPIC STENT PLACE: CPT

## 2021-04-07 ENCOUNTER — INPATIENT HOSPITAL (OUTPATIENT)
Dept: URBAN - METROPOLITAN AREA HOSPITAL 107 | Facility: HOSPITAL | Age: 86
End: 2021-04-07

## 2021-04-07 DIAGNOSIS — K22.2 ESOPHAGEAL OBSTRUCTION: ICD-10-CM

## 2021-04-07 DIAGNOSIS — K21.9 GASTRO-ESOPHAGEAL REFLUX DISEASE WITHOUT ESOPHAGITIS: ICD-10-CM

## 2021-04-07 PROCEDURE — 99232 SBSQ HOSP IP/OBS MODERATE 35: CPT

## 2021-04-08 ENCOUNTER — INPATIENT HOSPITAL (OUTPATIENT)
Dept: URBAN - METROPOLITAN AREA HOSPITAL 107 | Facility: HOSPITAL | Age: 86
End: 2021-04-08

## 2021-04-08 DIAGNOSIS — R13.10 DYSPHAGIA, UNSPECIFIED: ICD-10-CM

## 2021-04-08 DIAGNOSIS — R19.7 DIARRHEA, UNSPECIFIED: ICD-10-CM

## 2021-04-08 DIAGNOSIS — K26.9 DUODENAL ULCER, UNSPECIFIED AS ACUTE OR CHRONIC, WITHOUT HEM: ICD-10-CM

## 2021-04-08 DIAGNOSIS — K20.80 OTHER ESOPHAGITIS WITHOUT BLEEDING: ICD-10-CM

## 2021-04-08 PROCEDURE — 99233 SBSQ HOSP IP/OBS HIGH 50: CPT | Performed by: PHYSICIAN ASSISTANT

## 2021-09-10 ENCOUNTER — INPATIENT HOSPITAL (OUTPATIENT)
Dept: URBAN - METROPOLITAN AREA HOSPITAL 107 | Facility: HOSPITAL | Age: 86
End: 2021-09-10

## 2021-09-10 DIAGNOSIS — R10.13 EPIGASTRIC PAIN: ICD-10-CM

## 2021-09-10 DIAGNOSIS — R11.2 NAUSEA WITH VOMITING, UNSPECIFIED: ICD-10-CM

## 2021-09-10 DIAGNOSIS — I50.9 HEART FAILURE, UNSPECIFIED: ICD-10-CM

## 2021-09-10 DIAGNOSIS — R93.3 ABNORMAL FINDINGS ON DIAGNOSTIC IMAGING OF OTHER PARTS OF DI: ICD-10-CM

## 2021-09-10 DIAGNOSIS — K52.89 OTHER SPECIFIED NONINFECTIVE GASTROENTERITIS AND COLITIS: ICD-10-CM

## 2021-09-10 PROCEDURE — 99222 1ST HOSP IP/OBS MODERATE 55: CPT | Performed by: INTERNAL MEDICINE

## 2021-10-20 ENCOUNTER — APPOINTMENT (OUTPATIENT)
Dept: VACCINE CLINIC | Facility: HOSPITAL | Age: 86
End: 2021-10-20

## 2021-12-04 ENCOUNTER — INPATIENT HOSPITAL (OUTPATIENT)
Dept: URBAN - METROPOLITAN AREA HOSPITAL 107 | Facility: HOSPITAL | Age: 86
End: 2021-12-04

## 2021-12-04 DIAGNOSIS — K20.80 OTHER ESOPHAGITIS WITHOUT BLEEDING: ICD-10-CM

## 2021-12-04 DIAGNOSIS — T18.128A FOOD IN ESOPHAGUS CAUSING OTHER INJURY, INITIAL ENCOUNTER: ICD-10-CM

## 2021-12-04 DIAGNOSIS — R13.10 DYSPHAGIA, UNSPECIFIED: ICD-10-CM

## 2021-12-04 DIAGNOSIS — K22.2 ESOPHAGEAL OBSTRUCTION: ICD-10-CM

## 2021-12-04 PROCEDURE — 43215 ESOPHAGOSCOPY FLEX REMOVE FB: CPT | Performed by: INTERNAL MEDICINE

## 2021-12-05 ENCOUNTER — INPATIENT HOSPITAL (OUTPATIENT)
Dept: URBAN - METROPOLITAN AREA HOSPITAL 107 | Facility: HOSPITAL | Age: 86
End: 2021-12-05

## 2021-12-05 DIAGNOSIS — T18.128D FOOD IN ESOPHAGUS CAUSING OTHER INJURY, SUBSEQUENT ENCOUNTER: ICD-10-CM

## 2021-12-05 DIAGNOSIS — R13.10 DYSPHAGIA, UNSPECIFIED: ICD-10-CM

## 2021-12-05 DIAGNOSIS — K22.2 ESOPHAGEAL OBSTRUCTION: ICD-10-CM

## 2021-12-05 PROCEDURE — 99232 SBSQ HOSP IP/OBS MODERATE 35: CPT | Performed by: INTERNAL MEDICINE
